# Patient Record
Sex: FEMALE | Race: WHITE | Employment: FULL TIME | URBAN - METROPOLITAN AREA
[De-identification: names, ages, dates, MRNs, and addresses within clinical notes are randomized per-mention and may not be internally consistent; named-entity substitution may affect disease eponyms.]

---

## 2017-04-04 NOTE — PERIOP NOTES
Tranexamic Acid:  Used to minimize blood loss, reduce incidence of        symptomatic blood loss anemia and reduce need for blood transfusions                        Contraindicated: Patient is not a candidate to receive Tranexamic Acid         due to coronary artery disease with stents.            Willie Garcia RN

## 2017-04-11 ENCOUNTER — HOSPITAL ENCOUNTER (OUTPATIENT)
Dept: PREADMISSION TESTING | Age: 52
Discharge: HOME | End: 2017-04-11
Payer: COMMERCIAL

## 2017-04-11 ENCOUNTER — HOSPITAL ENCOUNTER (OUTPATIENT)
Dept: GENERAL RADIOLOGY | Age: 52
Discharge: HOME | End: 2017-04-11
Attending: ORTHOPAEDIC SURGERY
Payer: COMMERCIAL

## 2017-04-11 ENCOUNTER — HOSPITAL ENCOUNTER (OUTPATIENT)
Dept: PHYSICAL THERAPY | Age: 52
Discharge: HOME | End: 2017-04-11
Payer: COMMERCIAL

## 2017-04-11 VITALS
WEIGHT: 257 LBS | RESPIRATION RATE: 18 BRPM | SYSTOLIC BLOOD PRESSURE: 143 MMHG | DIASTOLIC BLOOD PRESSURE: 59 MMHG | HEIGHT: 68 IN | OXYGEN SATURATION: 96 % | HEART RATE: 44 BPM | BODY MASS INDEX: 38.95 KG/M2 | TEMPERATURE: 97.5 F

## 2017-04-11 LAB
ABO + RH BLD: NORMAL
ALBUMIN SERPL BCP-MCNC: 3.8 G/DL (ref 3.5–5)
ALBUMIN/GLOB SERPL: 0.9 {RATIO} (ref 1.1–2.2)
ALP SERPL-CCNC: 69 U/L (ref 45–117)
ALT SERPL-CCNC: 24 U/L (ref 12–78)
ANION GAP BLD CALC-SCNC: 6 MMOL/L (ref 5–15)
APPEARANCE UR: CLEAR
AST SERPL W P-5'-P-CCNC: 14 U/L (ref 15–37)
ATRIAL RATE: 44 BPM
BACTERIA URNS QL MICRO: NEGATIVE /HPF
BILIRUB SERPL-MCNC: 0.5 MG/DL (ref 0.2–1)
BILIRUB UR QL: NEGATIVE
BLOOD GROUP ANTIBODIES SERPL: NORMAL
BUN SERPL-MCNC: 23 MG/DL (ref 6–20)
BUN/CREAT SERPL: 28 (ref 12–20)
CALCIUM SERPL-MCNC: 9.3 MG/DL (ref 8.5–10.1)
CALCULATED P AXIS, ECG09: 39 DEGREES
CALCULATED R AXIS, ECG10: -50 DEGREES
CALCULATED T AXIS, ECG11: -22 DEGREES
CHLORIDE SERPL-SCNC: 102 MMOL/L (ref 97–108)
CO2 SERPL-SCNC: 31 MMOL/L (ref 21–32)
COLOR UR: NORMAL
CREAT SERPL-MCNC: 0.83 MG/DL (ref 0.55–1.02)
DIAGNOSIS, 93000: NORMAL
EPITH CASTS URNS QL MICRO: NORMAL /LPF
ERYTHROCYTE [DISTWIDTH] IN BLOOD BY AUTOMATED COUNT: 14 % (ref 11.5–14.5)
EST. AVERAGE GLUCOSE BLD GHB EST-MCNC: 114 MG/DL
GLOBULIN SER CALC-MCNC: 4.2 G/DL (ref 2–4)
GLUCOSE SERPL-MCNC: 105 MG/DL (ref 65–100)
GLUCOSE UR STRIP.AUTO-MCNC: NEGATIVE MG/DL
HBA1C MFR BLD: 5.6 % (ref 4.2–6.3)
HCT VFR BLD AUTO: 37.4 % (ref 35–47)
HGB BLD-MCNC: 12.1 G/DL (ref 11.5–16)
HGB UR QL STRIP: NEGATIVE
HYALINE CASTS URNS QL MICRO: NORMAL /LPF (ref 0–5)
INR PPP: 1 (ref 0.9–1.1)
KETONES UR QL STRIP.AUTO: NEGATIVE MG/DL
LEUKOCYTE ESTERASE UR QL STRIP.AUTO: NEGATIVE
MCH RBC QN AUTO: 27.7 PG (ref 26–34)
MCHC RBC AUTO-ENTMCNC: 32.4 G/DL (ref 30–36.5)
MCV RBC AUTO: 85.6 FL (ref 80–99)
NITRITE UR QL STRIP.AUTO: NEGATIVE
P-R INTERVAL, ECG05: 164 MS
PH UR STRIP: 5 [PH] (ref 5–8)
PLATELET # BLD AUTO: 247 K/UL (ref 150–400)
POTASSIUM SERPL-SCNC: 3.9 MMOL/L (ref 3.5–5.1)
PROT SERPL-MCNC: 8 G/DL (ref 6.4–8.2)
PROT UR STRIP-MCNC: NEGATIVE MG/DL
PROTHROMBIN TIME: 10.1 SEC (ref 9–11.1)
Q-T INTERVAL, ECG07: 508 MS
QRS DURATION, ECG06: 134 MS
QTC CALCULATION (BEZET), ECG08: 434 MS
RBC # BLD AUTO: 4.37 M/UL (ref 3.8–5.2)
RBC #/AREA URNS HPF: NORMAL /HPF (ref 0–5)
SODIUM SERPL-SCNC: 139 MMOL/L (ref 136–145)
SP GR UR REFRACTOMETRY: 1.01 (ref 1–1.03)
SPECIMEN EXP DATE BLD: NORMAL
UA: UC IF INDICATED,UAUC: NORMAL
UROBILINOGEN UR QL STRIP.AUTO: 0.2 EU/DL (ref 0.2–1)
VENTRICULAR RATE, ECG03: 44 BPM
WBC # BLD AUTO: 8.2 K/UL (ref 3.6–11)
WBC URNS QL MICRO: NORMAL /HPF (ref 0–4)

## 2017-04-11 PROCEDURE — 85610 PROTHROMBIN TIME: CPT | Performed by: ORTHOPAEDIC SURGERY

## 2017-04-11 PROCEDURE — G8980 MOBILITY D/C STATUS: HCPCS

## 2017-04-11 PROCEDURE — 83036 HEMOGLOBIN GLYCOSYLATED A1C: CPT | Performed by: ORTHOPAEDIC SURGERY

## 2017-04-11 PROCEDURE — 97110 THERAPEUTIC EXERCISES: CPT

## 2017-04-11 PROCEDURE — 86900 BLOOD TYPING SEROLOGIC ABO: CPT | Performed by: ORTHOPAEDIC SURGERY

## 2017-04-11 PROCEDURE — G8979 MOBILITY GOAL STATUS: HCPCS

## 2017-04-11 PROCEDURE — 80053 COMPREHEN METABOLIC PANEL: CPT | Performed by: ORTHOPAEDIC SURGERY

## 2017-04-11 PROCEDURE — G8978 MOBILITY CURRENT STATUS: HCPCS

## 2017-04-11 PROCEDURE — 36415 COLL VENOUS BLD VENIPUNCTURE: CPT | Performed by: ORTHOPAEDIC SURGERY

## 2017-04-11 PROCEDURE — 93005 ELECTROCARDIOGRAM TRACING: CPT

## 2017-04-11 PROCEDURE — 85027 COMPLETE CBC AUTOMATED: CPT | Performed by: ORTHOPAEDIC SURGERY

## 2017-04-11 PROCEDURE — 81001 URINALYSIS AUTO W/SCOPE: CPT | Performed by: ORTHOPAEDIC SURGERY

## 2017-04-11 PROCEDURE — 97161 PT EVAL LOW COMPLEX 20 MIN: CPT

## 2017-04-11 RX ORDER — FLAXSEED OIL 1000 MG
1 CAPSULE ORAL DAILY
COMMUNITY

## 2017-04-11 RX ORDER — SODIUM CHLORIDE, SODIUM LACTATE, POTASSIUM CHLORIDE, CALCIUM CHLORIDE 600; 310; 30; 20 MG/100ML; MG/100ML; MG/100ML; MG/100ML
25 INJECTION, SOLUTION INTRAVENOUS CONTINUOUS
Status: CANCELLED | OUTPATIENT
Start: 2017-04-19

## 2017-04-11 RX ORDER — ASPIRIN 81 MG/1
81 TABLET ORAL DAILY
COMMUNITY
End: 2017-04-20

## 2017-04-11 RX ORDER — ATORVASTATIN CALCIUM 40 MG/1
40 TABLET, FILM COATED ORAL DAILY
COMMUNITY

## 2017-04-11 RX ORDER — DOCUSATE SODIUM 100 MG/1
100 CAPSULE, LIQUID FILLED ORAL
COMMUNITY

## 2017-04-11 RX ORDER — HYDROCODONE BITARTRATE AND ACETAMINOPHEN 5; 325 MG/1; MG/1
1 TABLET ORAL
COMMUNITY
End: 2017-04-20

## 2017-04-11 RX ORDER — METFORMIN HYDROCHLORIDE 500 MG/1
500 TABLET ORAL
COMMUNITY

## 2017-04-11 NOTE — H&P
[facility]   [place], [address]   STAT PREOP HISTORY AND PHYSICAL       Name:  Gita Salcido   MR#:  [id number]   :  1965   Account #:  [de-identified]        Date of Adm:  2017       CHIEF COMPLAINT: Left knee pain. HISTORY: The patient is a very nice 38-year-old white female with   end-stage osteoarthritis of the left knee with severe limitation of range   of motion. She has had allergic reactions to cortisone and lidocaine,   and she is at the point where she has been taking anti-inflammatory   medications and her knee is not responding to this. She is now being   admitted for left total knee arthroplasty. ALLERGIES: CORTISONE CAUSES HER TO BE ITCHY AND HAVE   SMALL BUMPS. MEDICATIONS:   1. Atorvastatin 40 mg per day. 2. Celexa 20 mg per day. 3. Lasix 20 mg.   4. Hydrocodone. 5. Lisinopril/hydrochlorothiazide 20/12.5 one per day. 6. Meloxicam. 15 mg per day. 7. Metformin 500 mg in the morning. 8. Metoprolol 25 mg per day. ILLNESSES: Positive for hypertension, coronary artery disease,   diabetes, history of DVT, history of MRSA infection, left ankle. 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 SURGERIES: He has had cardiac catheterization with stent, vein   dilation, bilateral lower extremity, , tubal ligation. FAMILY HISTORY: Mother is alive. Father is alive with coronary artery   disease. 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 She is   left-handed. She does not smoke or drink. She is  and has 2   kids. REVIEW OF SYSTEMS   HEENT: She wears glasses. PULMONARY: No asthma, COPD, emphysema. CARDIAC: No chest pain, shortness of breath. GI: No peptic ulcer disease or GERD. : Negative. HEPATOBILIARY: No jaundice or hepatitis.     PHYSICAL EXAMINATION   GENERAL: Reveals an alert, pleasant white female. VITAL SIGNS: Blood pressure 110/60, pulse 60, temperature 98.2. NOSE, MOUTH, AND OROPHARYNX: Clear. CHEST: Clear to auscultation. CARDIAC: Sinus rhythm without murmurs, gallops, thrills. ABDOMEN: Soft. Bowel sounds are present. EXTREMITIES:  strength is equal in the upper extremities. She   has good forward flexion of the shoulders. Skin overlying the left knee   is intact. She has a good pulse in the left foot. The left knee range of   motion is from 15-85 degrees. IMAGING: X-rays demonstrate severe left knee osteoarthritis. IMPRESSION:   1. Severe left knee osteoarthritis. 2. Hypertension. 3. Coronary artery disease. 4. Diabetes mellitus. 5. History of MRSA infection. 6. History of DVT. PLAN: He is going to be admitted to the hospital and will have a left   total knee arthroplasty performed. I have discussed the proposed   surgery, the risks, the complications, alternatives ,and expected   postoperative course with her. She understands and agrees to   proceed.         MD Darnell Ramírez / [facility]   D:  04/11/2017   15:04   T:  04/11/2017   15:21   Job #:  [id number]

## 2017-04-11 NOTE — PERIOP NOTES
[facility]  Preoperative Instructions        Surgery Date 04/19/2017          Time of Arrival 0545 am Contact # [phone]    1. On the day of your surgery, please report to the Surgical Services Registration Desk and sign in at your designated time. The Surgery Center is located to the right of the Emergency Room. 2. You must have someone with you to drive you home. You should not drive a car for 24 hours following surgery. Please make arrangements for a friend or family member to stay with you for the first 24 hours after your surgery. 3. Do not have anything to eat or drink (including water, gum, mints, coffee, juice) after midnight 04/18/2017. This may not apply to medications prescribed by your physician. Please note special instructions, if applicable. If you are currently taking Plavix, Coumadin, or other blood-thinning agents, contact your surgeon for instructions. 4. We recommend you do not drink any alcoholic beverages for 24 hours before and after your surgery. 5. Have a list of all current medications, including vitamins, herbal supplements and any other over the counter medications. Stop all Aspirin and non-steroidal anti-inflammatory drugs (I.e. Advil, Aleve), as directed by your surgeon's office. Stop all vitamins and herbal supplements seven days prior to your surgery. 6. Wear comfortable clothes. Wear glasses instead of contacts. Do not bring any money or jewelry. Please bring picture ID, insurance card, and any prearranged co-payment or hospital payment. Do not wear make-up, particularly mascara the morning of your surgery. Do not wear nail polish, particularly if you are having foot /hand surgery. Wear your hair loose or down, no ponytails, buns, alexx pins or clips. All body piercings must be removed.   Please shower with antibacterial soap for three consecutive days before and on the morning of surgery, but do not apply any lotions, powders or deodorants after the shower on the day of surgery. Please use a fresh towels after each shower. Please sleep in clean clothes and change bed linens the night before surgery. Please do not shave for 48 hours prior to surgery. Shaving of the face is acceptable. 7. You should understand that if you do not follow these instructions your surgery may be cancelled. If your physical condition changes (I.e. fever, cold or flu) please contact your surgeon as soon as possible. 8. It is important that you be on time. If a situation occurs where you may be late, please call [phone] (OR Holding Area). 9. If you have any questions and or problems, please call [phone] (Pre-admission Testing). 10. Your surgery time may be subject to change. You will receive a phone call the evening prior if your time changes. 11.  If having outpatient surgery, you must have someone to drive you here, stay with you during the duration of your stay, and to drive you home at time of discharge. Special Instructions:    MEDICATIONS TO TAKE THE MORNING OF SURGERY WITH A SIP OF WATER:celexa, hydrocodone or tramadol if needed, metoprolol      I understand a pre-operative phone call will be made to verify my surgery time. In the event that I am not available, I give permission for a message to be left on my answering service and/or with another person?   yes         ___________________      __________   _________    (Signature of Patient)             (Witness)                (Date and Time)

## 2017-04-11 NOTE — PROGRESS NOTES
[facility]  Physical Therapy Pre-surgery evaluation  [address] [facility], [address] [facility]    physical Therapy pre TKR surgery EVALUATION  Patient: Jaspal Olvera (47 y.o. female)  Date: 4/11/2017  Primary Diagnosis: left knee        Precautions:        ASSESSMENT :  Based on the objective data described below, the patient presents with impaired gait, balance, pain, and overall high level functional mobility due to end stage degenerative joint disease in the left knee. Pt reports independence w/ house ambulation however use of SPC during community ambulation, independence w/ ADLs, and denies history of falls. Discussed anticipated disposition to home with possible discharge within a 1 to 2 day time frame post-surgery. Patient in agreement. Pt reports that she lives with  and he will be providing 24hr assist at discharge. GOALS: (Goals have been discussed and agreed upon with patient.)  DISCHARGE GOALS: Time Frame: 1 DAY  1. Patient will demonstrate increased strength, range of motion, and pain control via a home exercise program in order to minimize functional deficits in preparation for their upcoming surgery. This will be achieved by using education, demonstration and through the use of an informational handout including a home exercise program.  REHABILITATION POTENTIAL FOR STATED GOALS: Good     RECOMMENDATIONS AND PLANNED INTERVENTIONS: (Benefits and precautions of physical therapy have been discussed with the patient.)  1. Home Exercise Program  TREATMENT PLAN EFFECTIVE DATES: 4/11/2017 TO 4/11/2017  FREQUENCY/DURATION: Patient to continue to perform home exercise program at least twice daily until her surgery. SUBJECTIVE:   Patient stated My knee kills me after a day at work. I work as a CNA.     OBJECTIVE DATA SUMMARY:   HISTORY:    Past Medical History:   Diagnosis Date    Arthritis     Deep vein thrombosis (DVT) ([address]     left leg    Hypertension     Ill-defined condition     high cholesterol    Ill-defined condition     non healing vein ulcer    MI (my[facility] infarction) [address] [facility]     2 stents     Past Surgical History:   Procedure Laterality Date    CARDIAC SURG PROCEDURE UNLIST      x2 stents    HX  SECTION      VASCULAR SURGERY PROCEDURE UNLIST      angioplasty     Prior Level of Function/Home Situation: Independent w/ household ambulation however use of SPC during community ambulation, ADLs, and denies history of falls. Still driving and working full time. Lives with  who will be assisting at discharge. Denies SOB with daily activity   Personal factors and/or comorbidities impacting plan of care:     Patient []   does  [x]   does not state signs/symptoms of shortness of breath/dyspnea on exertion/respiratory distress. Home Situation  Home Environment: Private residence  # Steps to Enter: 3  Rails to Enter: Yes  Hand Rails : Bilateral  Wheelchair Ramp: No  One/Two Story Residence: One story  Living Alone: No ( )  Support Systems: Family member(s)  Patient Expects to be Discharged to[de-identified] Private residence  Current DME Used/Available at Home: Cane, straight  Tub or Shower Type: Tub/Shower combination    EXAMINATION/PRESENTATION/DECISION MAKING:     ADLs (Current Functional Status):    Bathing/Showering:   [x] Independent  [] Requires Assistance from Someone  [] Sponge Bath Only   Ambulation:  [x] Independent  [] Walk Indoors Only  [] Walk Outdoors  [] Use Assistive Device  [] Use Wheelchair Only     Dressing:  [x] Independent    Requires Assistance from Someone for:  [] Sock/Shoes  [] Pants  [] Everything   Household Activities:  [x] Routine house and yard work  [] Light Housework Only  [] None       Critical Behavior:                Strength:    Strength: Generally decreased, functional                    Tone & Sensation:   Tone: Normal              Sensation: Intact               Range Of Motion:  AROM: Generally decreased, functional                       Coordination:  Coordination: Within functional limits    Functional Mobility:  Transfers:  Sit to Stand: Independent  Stand to Sit: Independent                       Balance:   Sitting: Intact  Standing: Intact  Ambulation/Gait Training:  Distance (ft): 40 Feet (ft)     Ambulation - Level of Assistance: Independent        Gait Abnormalities: Antalgic        Base of Support: Widened     Speed/Betina: Pace decreased (<100 feet/min)                     Antalgic gait pattern however independent overall with all mobility. No balance deficits. Therapeutic Exercises:   The patient was educated in, has demonstrated, and has received written instructions to complete for their home exercise program per total knee replacement protocol. Functional Measure:  Lower Extremity Functional Scale (LEFS):      Score 10/80     Percentage of impairment CH  0% CI  1-19% CJ  20-39% CK  40-59% CL  60-79% CM  80-99% CN  100%   LEFS score:  0-80 80 64-79 47-63 31-46 16-30 1-15 0     Cutt-offs: None established  TKA and HERMINIA:   (Micaela et al, 2000)  MDC = 9 points   MCID = 9 points           G codes: In compliance with CMSs Claims Based Outcome Reporting, the following G-code set was chosen for this patient based on their primary functional limitation being treated: The outcome measure chosen to determine the severity of the functional limitation was the LEFS with a score of 10/80 which was correlated with the impairment scale.     ? Mobility - Walking and Moving Around:     - CURRENT STATUS: CM - 80%-99% impaired, limited or restricted    - GOAL STATUS: CM - 80%-99% impaired, limited or restricted    - D/C STATUS:  CM - 80%-99% impaired, limited or restricted     Pain:  Pain Scale 1: Numeric (0 - 10)  Pain Intensity 1: 10  Pain Location 1: Knee  Pain Orientation 1: Left  Pain Description 1: Aching       Activity Tolerance:   VSS on RA, no SOB  Patient []   does  [x]   does not demonstrate signs/symptoms of shortness of breath/dyspnea on exertion/respiratory distress. COMMUNICATION/EDUCATION:   The patient was educated on:  [x]         Importance of post-operative mobility to achieve their desired outcomes and restore biological function  [x]         The key post-operative time frame to address ROM to prevent additional complications    The patients plan of care was discussed with:   [x]         The patient verbalized understanding of her plan in preparation for their upcoming surgery  []         The patient's  was present for this session  []         The patient reports that he/she does not have a  identified at this time  []         The  verbalized understanding of the education regarding the patient's upcoming surgery  [x]         Patient/family agree to work toward stated goals and plan of care. []         Patient understands intent and goals of therapy, but is neutral about his/her participation. []         Patient is unable to participate in goal setting and plan of care.     Thank you for this referral.  Mily Egan, PT, DPT   Time Calculation: 23 mins

## 2017-04-11 NOTE — PERIOP NOTES
Ms. Jerome Oneil has a history of MRSA infection of the ankle. Dr. Noni Jeffries has ordered Mupirocin ointment for the nares bid from now till surgery, as well as chlorhexidine body wash daily till surgery. We have also ordered Vancomycin in addition to her IV Ancef for pre op antiobiotic.   Jl Ferreira RN

## 2017-04-12 LAB
BACTERIA SPEC CULT: NORMAL
BACTERIA SPEC CULT: NORMAL
SERVICE CMNT-IMP: NORMAL

## 2017-04-12 NOTE — PERIOP NOTES
EKG from 04/11/2017 reviewed by Dr Criss Hart along with comparison from 07/23/2013 and cleared for surgery.

## 2017-04-14 NOTE — PERIOP NOTES
Spoke to Jose Osman, Infection Control Nurse and asked about if pt can be de-flagged since MRSA culture is negative. She will de- flag pt.

## 2017-04-17 NOTE — PERIOP NOTES
Called Dr Hall's office and left voice message for nurse regarding clearance note. Requested clearance note to be faxed.

## 2017-04-18 NOTE — PERIOP NOTES
Pre-op call made and patient given TOA. Patient instructed may have water up to 3:45 am and then NPO.  Patient verbalized understanding

## 2017-04-19 ENCOUNTER — HOSPITAL ENCOUNTER (INPATIENT)
Age: 52
LOS: 1 days | Discharge: HOME HEALTH | DRG: 470 | End: 2017-04-20
Attending: ORTHOPAEDIC SURGERY | Admitting: ORTHOPAEDIC SURGERY
Payer: COMMERCIAL

## 2017-04-19 ENCOUNTER — ANESTHESIA EVENT (OUTPATIENT)
Dept: SURGERY | Age: 52
DRG: 470 | End: 2017-04-19
Payer: COMMERCIAL

## 2017-04-19 ENCOUNTER — ANESTHESIA (OUTPATIENT)
Dept: SURGERY | Age: 52
DRG: 470 | End: 2017-04-19
Payer: COMMERCIAL

## 2017-04-19 PROBLEM — M17.12 PRIMARY LOCALIZED OSTEOARTHRITIS OF LEFT KNEE: Status: ACTIVE | Noted: 2017-04-19

## 2017-04-19 PROBLEM — M17.12 ARTHRITIS OF KNEE, LEFT: Status: ACTIVE | Noted: 2017-04-19

## 2017-04-19 LAB
GLUCOSE BLD STRIP.AUTO-MCNC: 113 MG/DL (ref 65–100)
GLUCOSE BLD STRIP.AUTO-MCNC: 113 MG/DL (ref 65–100)
GLUCOSE BLD STRIP.AUTO-MCNC: 114 MG/DL (ref 65–100)
GLUCOSE BLD STRIP.AUTO-MCNC: 117 MG/DL (ref 65–100)
GLUCOSE BLD STRIP.AUTO-MCNC: 121 MG/DL (ref 65–100)
HCG UR QL: NEGATIVE
SERVICE CMNT-IMP: ABNORMAL

## 2017-04-19 PROCEDURE — 77030018836 HC SOL IRR NACL ICUM -A: Performed by: ORTHOPAEDIC SURGERY

## 2017-04-19 PROCEDURE — 74011000250 HC RX REV CODE- 250

## 2017-04-19 PROCEDURE — 76210000006 HC OR PH I REC 0.5 TO 1 HR: Performed by: ORTHOPAEDIC SURGERY

## 2017-04-19 PROCEDURE — 74011250636 HC RX REV CODE- 250/636: Performed by: ANESTHESIOLOGY

## 2017-04-19 PROCEDURE — 77030020061 HC IV BLD WRMR ADMIN SET 3M -B: Performed by: ANESTHESIOLOGY

## 2017-04-19 PROCEDURE — 0SRD0J9 REPLACEMENT OF LEFT KNEE JOINT WITH SYNTHETIC SUBSTITUTE, CEMENTED, OPEN APPROACH: ICD-10-PCS | Performed by: ORTHOPAEDIC SURGERY

## 2017-04-19 PROCEDURE — C1713 ANCHOR/SCREW BN/BN,TIS/BN: HCPCS | Performed by: ORTHOPAEDIC SURGERY

## 2017-04-19 PROCEDURE — 74011250637 HC RX REV CODE- 250/637: Performed by: ORTHOPAEDIC SURGERY

## 2017-04-19 PROCEDURE — 77030018846 HC SOL IRR STRL H20 ICUM -A: Performed by: ORTHOPAEDIC SURGERY

## 2017-04-19 PROCEDURE — 76060000036 HC ANESTHESIA 2.5 TO 3 HR: Performed by: ORTHOPAEDIC SURGERY

## 2017-04-19 PROCEDURE — 77030035236 HC SUT PDS STRATFX BARB J&J -B: Performed by: ORTHOPAEDIC SURGERY

## 2017-04-19 PROCEDURE — 77030018779 HC MIX CEM PRSM J&J -B: Performed by: ORTHOPAEDIC SURGERY

## 2017-04-19 PROCEDURE — 77030016547 HC BLD SAW SAG1 STRY -B: Performed by: ORTHOPAEDIC SURGERY

## 2017-04-19 PROCEDURE — 74011250636 HC RX REV CODE- 250/636: Performed by: ORTHOPAEDIC SURGERY

## 2017-04-19 PROCEDURE — 65270000029 HC RM PRIVATE

## 2017-04-19 PROCEDURE — 74011000258 HC RX REV CODE- 258: Performed by: ORTHOPAEDIC SURGERY

## 2017-04-19 PROCEDURE — 97161 PT EVAL LOW COMPLEX 20 MIN: CPT

## 2017-04-19 PROCEDURE — 77030028907 HC WRP KNEE WO BGS SOLM -B

## 2017-04-19 PROCEDURE — 77030032490 HC SLV COMPR SCD KNE COVD -B: Performed by: ORTHOPAEDIC SURGERY

## 2017-04-19 PROCEDURE — 77030020788: Performed by: ORTHOPAEDIC SURGERY

## 2017-04-19 PROCEDURE — 77030020782 HC GWN BAIR PAWS FLX 3M -B

## 2017-04-19 PROCEDURE — 77030031139 HC SUT VCRL2 J&J -A: Performed by: ORTHOPAEDIC SURGERY

## 2017-04-19 PROCEDURE — 77030000032 HC CUF TRNQT ZIMM -B: Performed by: ORTHOPAEDIC SURGERY

## 2017-04-19 PROCEDURE — G8978 MOBILITY CURRENT STATUS: HCPCS

## 2017-04-19 PROCEDURE — 74011250636 HC RX REV CODE- 250/636

## 2017-04-19 PROCEDURE — 77030019908 HC STETH ESOPH SIMS -A: Performed by: ANESTHESIOLOGY

## 2017-04-19 PROCEDURE — 97116 GAIT TRAINING THERAPY: CPT

## 2017-04-19 PROCEDURE — 77030011640 HC PAD GRND REM COVD -A: Performed by: ORTHOPAEDIC SURGERY

## 2017-04-19 PROCEDURE — P9045 ALBUMIN (HUMAN), 5%, 250 ML: HCPCS

## 2017-04-19 PROCEDURE — 82962 GLUCOSE BLOOD TEST: CPT

## 2017-04-19 PROCEDURE — 77030014125 HC TY EPDRL BBMI -B: Performed by: ANESTHESIOLOGY

## 2017-04-19 PROCEDURE — 77030008467 HC STPLR SKN COVD -B: Performed by: ORTHOPAEDIC SURGERY

## 2017-04-19 PROCEDURE — 77030033138 HC SUT PGA STRATFX J&J -B: Performed by: ORTHOPAEDIC SURGERY

## 2017-04-19 PROCEDURE — 74011000250 HC RX REV CODE- 250: Performed by: ORTHOPAEDIC SURGERY

## 2017-04-19 PROCEDURE — 77030012406 HC DRN WND PENRS BARD -A: Performed by: ORTHOPAEDIC SURGERY

## 2017-04-19 PROCEDURE — G8979 MOBILITY GOAL STATUS: HCPCS

## 2017-04-19 PROCEDURE — 77030034849: Performed by: ORTHOPAEDIC SURGERY

## 2017-04-19 PROCEDURE — 76010000172 HC OR TIME 2.5 TO 3 HR INTENSV-TIER 1: Performed by: ORTHOPAEDIC SURGERY

## 2017-04-19 PROCEDURE — 74011000272 HC RX REV CODE- 272: Performed by: ORTHOPAEDIC SURGERY

## 2017-04-19 PROCEDURE — 81025 URINE PREGNANCY TEST: CPT

## 2017-04-19 PROCEDURE — C1776 JOINT DEVICE (IMPLANTABLE): HCPCS | Performed by: ORTHOPAEDIC SURGERY

## 2017-04-19 DEVICE — COMPONENT FEM SZ 7 L KNEE POST STBL CEM ATTUNE: Type: IMPLANTABLE DEVICE | Site: KNEE | Status: FUNCTIONAL

## 2017-04-19 DEVICE — COMPONENT PAT DIA38MM POLYETH DOME CEM MEDIALIZED ATTUNE: Type: IMPLANTABLE DEVICE | Site: KNEE | Status: FUNCTIONAL

## 2017-04-19 DEVICE — INSERT TIB RP FEM KNEE CEM: Type: IMPLANTABLE DEVICE | Site: KNEE | Status: FUNCTIONAL

## 2017-04-19 DEVICE — INSERT TIB SZ 7 THK7MM KNEE POST STBL FIX BEAR ATTUNE: Type: IMPLANTABLE DEVICE | Site: KNEE | Status: FUNCTIONAL

## 2017-04-19 DEVICE — CEMENT BNE 40GM FULL DOSE PMMA W/O GENT M VISC N RADPQ LNG: Type: IMPLANTABLE DEVICE | Site: KNEE | Status: FUNCTIONAL

## 2017-04-19 DEVICE — BASEPLATE TIB SZ 5 KNEE CEM FIX BEAR ATTUNE: Type: IMPLANTABLE DEVICE | Site: KNEE | Status: FUNCTIONAL

## 2017-04-19 RX ORDER — VANCOMYCIN/0.9 % SOD CHLORIDE 1.5G/250ML
1500 PLASTIC BAG, INJECTION (ML) INTRAVENOUS ONCE
Status: COMPLETED | OUTPATIENT
Start: 2017-04-19 | End: 2017-04-19

## 2017-04-19 RX ORDER — ONDANSETRON 2 MG/ML
4 INJECTION INTRAMUSCULAR; INTRAVENOUS AS NEEDED
Status: DISCONTINUED | OUTPATIENT
Start: 2017-04-19 | End: 2017-04-19 | Stop reason: HOSPADM

## 2017-04-19 RX ORDER — PROPOFOL 10 MG/ML
INJECTION, EMULSION INTRAVENOUS
Status: DISCONTINUED | OUTPATIENT
Start: 2017-04-19 | End: 2017-04-19 | Stop reason: HOSPADM

## 2017-04-19 RX ORDER — VANCOMYCIN/0.9 % SOD CHLORIDE 1.5G/250ML
1500 PLASTIC BAG, INJECTION (ML) INTRAVENOUS EVERY 12 HOURS
Status: COMPLETED | OUTPATIENT
Start: 2017-04-19 | End: 2017-04-19

## 2017-04-19 RX ORDER — KETAMINE HYDROCHLORIDE 100 MG/ML
INJECTION, SOLUTION INTRAMUSCULAR; INTRAVENOUS AS NEEDED
Status: DISCONTINUED | OUTPATIENT
Start: 2017-04-19 | End: 2017-04-19 | Stop reason: HOSPADM

## 2017-04-19 RX ORDER — HYDROMORPHONE HYDROCHLORIDE 1 MG/ML
0.5 INJECTION, SOLUTION INTRAMUSCULAR; INTRAVENOUS; SUBCUTANEOUS
Status: DISCONTINUED | OUTPATIENT
Start: 2017-04-19 | End: 2017-04-19 | Stop reason: HOSPADM

## 2017-04-19 RX ORDER — SODIUM CHLORIDE 9 MG/ML
125 INJECTION, SOLUTION INTRAVENOUS CONTINUOUS
Status: DISPENSED | OUTPATIENT
Start: 2017-04-19 | End: 2017-04-20

## 2017-04-19 RX ORDER — DIPHENHYDRAMINE HYDROCHLORIDE 50 MG/ML
12.5 INJECTION, SOLUTION INTRAMUSCULAR; INTRAVENOUS AS NEEDED
Status: DISCONTINUED | OUTPATIENT
Start: 2017-04-19 | End: 2017-04-19 | Stop reason: HOSPADM

## 2017-04-19 RX ORDER — NALOXONE HYDROCHLORIDE 0.4 MG/ML
0.4 INJECTION, SOLUTION INTRAMUSCULAR; INTRAVENOUS; SUBCUTANEOUS AS NEEDED
Status: DISCONTINUED | OUTPATIENT
Start: 2017-04-19 | End: 2017-04-20 | Stop reason: HOSPADM

## 2017-04-19 RX ORDER — AMOXICILLIN 250 MG
1 CAPSULE ORAL 2 TIMES DAILY
Status: DISCONTINUED | OUTPATIENT
Start: 2017-04-19 | End: 2017-04-20 | Stop reason: HOSPADM

## 2017-04-19 RX ORDER — KETOROLAC TROMETHAMINE 30 MG/ML
15 INJECTION, SOLUTION INTRAMUSCULAR; INTRAVENOUS EVERY 6 HOURS
Status: DISPENSED | OUTPATIENT
Start: 2017-04-19 | End: 2017-04-20

## 2017-04-19 RX ORDER — MAGNESIUM SULFATE 100 %
4 CRYSTALS MISCELLANEOUS AS NEEDED
Status: DISCONTINUED | OUTPATIENT
Start: 2017-04-19 | End: 2017-04-20 | Stop reason: HOSPADM

## 2017-04-19 RX ORDER — HYDROCODONE BITARTRATE AND ACETAMINOPHEN 5; 325 MG/1; MG/1
1 TABLET ORAL AS NEEDED
Status: DISCONTINUED | OUTPATIENT
Start: 2017-04-19 | End: 2017-04-19 | Stop reason: HOSPADM

## 2017-04-19 RX ORDER — CITALOPRAM 20 MG/1
20 TABLET, FILM COATED ORAL DAILY
Status: DISCONTINUED | OUTPATIENT
Start: 2017-04-19 | End: 2017-04-20 | Stop reason: HOSPADM

## 2017-04-19 RX ORDER — LIDOCAINE HYDROCHLORIDE 10 MG/ML
0.1 INJECTION, SOLUTION EPIDURAL; INFILTRATION; INTRACAUDAL; PERINEURAL AS NEEDED
Status: DISCONTINUED | OUTPATIENT
Start: 2017-04-19 | End: 2017-04-19 | Stop reason: HOSPADM

## 2017-04-19 RX ORDER — SODIUM CHLORIDE 0.9 % (FLUSH) 0.9 %
5-10 SYRINGE (ML) INJECTION AS NEEDED
Status: DISCONTINUED | OUTPATIENT
Start: 2017-04-19 | End: 2017-04-20 | Stop reason: HOSPADM

## 2017-04-19 RX ORDER — POLYETHYLENE GLYCOL 3350 17 G/17G
17 POWDER, FOR SOLUTION ORAL DAILY
Status: DISCONTINUED | OUTPATIENT
Start: 2017-04-19 | End: 2017-04-20 | Stop reason: HOSPADM

## 2017-04-19 RX ORDER — BUPIVACAINE HYDROCHLORIDE 7.5 MG/ML
INJECTION, SOLUTION EPIDURAL; RETROBULBAR AS NEEDED
Status: DISCONTINUED | OUTPATIENT
Start: 2017-04-19 | End: 2017-04-19 | Stop reason: HOSPADM

## 2017-04-19 RX ORDER — METOPROLOL TARTRATE 25 MG/1
25 TABLET, FILM COATED ORAL DAILY
Status: DISCONTINUED | OUTPATIENT
Start: 2017-04-19 | End: 2017-04-20 | Stop reason: HOSPADM

## 2017-04-19 RX ORDER — GLYCOPYRROLATE 0.2 MG/ML
INJECTION INTRAMUSCULAR; INTRAVENOUS AS NEEDED
Status: DISCONTINUED | OUTPATIENT
Start: 2017-04-19 | End: 2017-04-19 | Stop reason: HOSPADM

## 2017-04-19 RX ORDER — ONDANSETRON 2 MG/ML
4 INJECTION INTRAMUSCULAR; INTRAVENOUS
Status: ACTIVE | OUTPATIENT
Start: 2017-04-19 | End: 2017-04-20

## 2017-04-19 RX ORDER — SODIUM CHLORIDE, SODIUM LACTATE, POTASSIUM CHLORIDE, CALCIUM CHLORIDE 600; 310; 30; 20 MG/100ML; MG/100ML; MG/100ML; MG/100ML
25 INJECTION, SOLUTION INTRAVENOUS CONTINUOUS
Status: DISCONTINUED | OUTPATIENT
Start: 2017-04-19 | End: 2017-04-19 | Stop reason: HOSPADM

## 2017-04-19 RX ORDER — PROPOFOL 10 MG/ML
INJECTION, EMULSION INTRAVENOUS AS NEEDED
Status: DISCONTINUED | OUTPATIENT
Start: 2017-04-19 | End: 2017-04-19 | Stop reason: HOSPADM

## 2017-04-19 RX ORDER — OXYCODONE HYDROCHLORIDE 5 MG/1
10 TABLET ORAL
Status: DISCONTINUED | OUTPATIENT
Start: 2017-04-19 | End: 2017-04-20 | Stop reason: HOSPADM

## 2017-04-19 RX ORDER — VANCOMYCIN HYDROCHLORIDE 1 G/20ML
INJECTION, POWDER, LYOPHILIZED, FOR SOLUTION INTRAVENOUS AS NEEDED
Status: DISCONTINUED | OUTPATIENT
Start: 2017-04-19 | End: 2017-04-19 | Stop reason: HOSPADM

## 2017-04-19 RX ORDER — DEXTROSE 50 % IN WATER (D50W) INTRAVENOUS SYRINGE
12.5-25 AS NEEDED
Status: DISCONTINUED | OUTPATIENT
Start: 2017-04-19 | End: 2017-04-20 | Stop reason: HOSPADM

## 2017-04-19 RX ORDER — ACETAMINOPHEN 325 MG/1
650 TABLET ORAL EVERY 6 HOURS
Status: DISCONTINUED | OUTPATIENT
Start: 2017-04-19 | End: 2017-04-20 | Stop reason: HOSPADM

## 2017-04-19 RX ORDER — FENTANYL CITRATE 50 UG/ML
50 INJECTION, SOLUTION INTRAMUSCULAR; INTRAVENOUS AS NEEDED
Status: DISCONTINUED | OUTPATIENT
Start: 2017-04-19 | End: 2017-04-19 | Stop reason: HOSPADM

## 2017-04-19 RX ORDER — ASPIRIN 325 MG
325 TABLET, DELAYED RELEASE (ENTERIC COATED) ORAL 2 TIMES DAILY
Status: DISCONTINUED | OUTPATIENT
Start: 2017-04-19 | End: 2017-04-20 | Stop reason: HOSPADM

## 2017-04-19 RX ORDER — ACETAMINOPHEN 500 MG
1000 TABLET ORAL ONCE
Status: COMPLETED | OUTPATIENT
Start: 2017-04-19 | End: 2017-04-19

## 2017-04-19 RX ORDER — HYDROMORPHONE HYDROCHLORIDE 1 MG/ML
INJECTION, SOLUTION INTRAMUSCULAR; INTRAVENOUS; SUBCUTANEOUS AS NEEDED
Status: DISCONTINUED | OUTPATIENT
Start: 2017-04-19 | End: 2017-04-19 | Stop reason: HOSPADM

## 2017-04-19 RX ORDER — FENTANYL CITRATE 50 UG/ML
25 INJECTION, SOLUTION INTRAMUSCULAR; INTRAVENOUS
Status: DISCONTINUED | OUTPATIENT
Start: 2017-04-19 | End: 2017-04-19 | Stop reason: HOSPADM

## 2017-04-19 RX ORDER — SODIUM CHLORIDE 0.9 % (FLUSH) 0.9 %
5-10 SYRINGE (ML) INJECTION EVERY 8 HOURS
Status: DISCONTINUED | OUTPATIENT
Start: 2017-04-20 | End: 2017-04-20 | Stop reason: HOSPADM

## 2017-04-19 RX ORDER — FACIAL-BODY WIPES
10 EACH TOPICAL DAILY PRN
Status: DISCONTINUED | OUTPATIENT
Start: 2017-04-21 | End: 2017-04-20 | Stop reason: HOSPADM

## 2017-04-19 RX ORDER — ATORVASTATIN CALCIUM 40 MG/1
40 TABLET, FILM COATED ORAL DAILY
Status: DISCONTINUED | OUTPATIENT
Start: 2017-04-19 | End: 2017-04-20 | Stop reason: HOSPADM

## 2017-04-19 RX ORDER — OXYCODONE HYDROCHLORIDE 5 MG/1
5 TABLET ORAL
Status: DISCONTINUED | OUTPATIENT
Start: 2017-04-19 | End: 2017-04-20 | Stop reason: HOSPADM

## 2017-04-19 RX ORDER — INSULIN LISPRO 100 [IU]/ML
INJECTION, SOLUTION INTRAVENOUS; SUBCUTANEOUS
Status: DISCONTINUED | OUTPATIENT
Start: 2017-04-19 | End: 2017-04-20 | Stop reason: HOSPADM

## 2017-04-19 RX ORDER — DIPHENHYDRAMINE HCL 12.5MG/5ML
12.5 ELIXIR ORAL
Status: ACTIVE | OUTPATIENT
Start: 2017-04-19 | End: 2017-04-20

## 2017-04-19 RX ORDER — SODIUM CHLORIDE, SODIUM LACTATE, POTASSIUM CHLORIDE, CALCIUM CHLORIDE 600; 310; 30; 20 MG/100ML; MG/100ML; MG/100ML; MG/100ML
100 INJECTION, SOLUTION INTRAVENOUS CONTINUOUS
Status: DISCONTINUED | OUTPATIENT
Start: 2017-04-19 | End: 2017-04-19 | Stop reason: HOSPADM

## 2017-04-19 RX ORDER — MIDAZOLAM HYDROCHLORIDE 1 MG/ML
1 INJECTION, SOLUTION INTRAMUSCULAR; INTRAVENOUS AS NEEDED
Status: DISCONTINUED | OUTPATIENT
Start: 2017-04-19 | End: 2017-04-19 | Stop reason: HOSPADM

## 2017-04-19 RX ORDER — FUROSEMIDE 20 MG/1
20 TABLET ORAL DAILY
Status: DISCONTINUED | OUTPATIENT
Start: 2017-04-20 | End: 2017-04-20 | Stop reason: HOSPADM

## 2017-04-19 RX ORDER — CELECOXIB 200 MG/1
200 CAPSULE ORAL ONCE
Status: COMPLETED | OUTPATIENT
Start: 2017-04-19 | End: 2017-04-19

## 2017-04-19 RX ORDER — DOCUSATE SODIUM 100 MG/1
100 CAPSULE, LIQUID FILLED ORAL
Status: DISCONTINUED | OUTPATIENT
Start: 2017-04-19 | End: 2017-04-20 | Stop reason: HOSPADM

## 2017-04-19 RX ORDER — FAMOTIDINE 20 MG/1
20 TABLET, FILM COATED ORAL 2 TIMES DAILY
Status: DISCONTINUED | OUTPATIENT
Start: 2017-04-19 | End: 2017-04-20 | Stop reason: HOSPADM

## 2017-04-19 RX ORDER — ALBUMIN HUMAN 50 G/1000ML
SOLUTION INTRAVENOUS AS NEEDED
Status: DISCONTINUED | OUTPATIENT
Start: 2017-04-19 | End: 2017-04-19 | Stop reason: HOSPADM

## 2017-04-19 RX ORDER — MIDAZOLAM HYDROCHLORIDE 1 MG/ML
0.5 INJECTION, SOLUTION INTRAMUSCULAR; INTRAVENOUS
Status: DISCONTINUED | OUTPATIENT
Start: 2017-04-19 | End: 2017-04-19 | Stop reason: HOSPADM

## 2017-04-19 RX ORDER — METFORMIN HYDROCHLORIDE 500 MG/1
500 TABLET ORAL
Status: DISCONTINUED | OUTPATIENT
Start: 2017-04-20 | End: 2017-04-20 | Stop reason: HOSPADM

## 2017-04-19 RX ORDER — ONDANSETRON 2 MG/ML
INJECTION INTRAMUSCULAR; INTRAVENOUS AS NEEDED
Status: DISCONTINUED | OUTPATIENT
Start: 2017-04-19 | End: 2017-04-19 | Stop reason: HOSPADM

## 2017-04-19 RX ORDER — CEFAZOLIN SODIUM IN 0.9 % NACL 2 G/100 ML
2 PLASTIC BAG, INJECTION (ML) INTRAVENOUS ONCE
Status: COMPLETED | OUTPATIENT
Start: 2017-04-19 | End: 2017-04-19

## 2017-04-19 RX ORDER — MIDAZOLAM HYDROCHLORIDE 1 MG/ML
INJECTION, SOLUTION INTRAMUSCULAR; INTRAVENOUS AS NEEDED
Status: DISCONTINUED | OUTPATIENT
Start: 2017-04-19 | End: 2017-04-19 | Stop reason: HOSPADM

## 2017-04-19 RX ADMIN — MIDAZOLAM HYDROCHLORIDE 2 MG: 1 INJECTION, SOLUTION INTRAMUSCULAR; INTRAVENOUS at 07:31

## 2017-04-19 RX ADMIN — FAMOTIDINE 20 MG: 20 TABLET ORAL at 18:15

## 2017-04-19 RX ADMIN — OXYCODONE HYDROCHLORIDE 10 MG: 5 TABLET ORAL at 15:32

## 2017-04-19 RX ADMIN — ACETAMINOPHEN 1000 MG: 500 TABLET ORAL at 06:37

## 2017-04-19 RX ADMIN — OXYCODONE HYDROCHLORIDE 10 MG: 5 TABLET ORAL at 12:46

## 2017-04-19 RX ADMIN — ALBUMIN HUMAN 250 ML: 50 SOLUTION INTRAVENOUS at 08:49

## 2017-04-19 RX ADMIN — PROPOFOL 25 MCG/KG/MIN: 10 INJECTION, EMULSION INTRAVENOUS at 07:49

## 2017-04-19 RX ADMIN — CELECOXIB 200 MG: 200 CAPSULE ORAL at 06:37

## 2017-04-19 RX ADMIN — VANCOMYCIN HYDROCHLORIDE 1500 MG: 10 INJECTION, POWDER, LYOPHILIZED, FOR SOLUTION INTRAVENOUS at 06:42

## 2017-04-19 RX ADMIN — ACETAMINOPHEN 650 MG: 325 TABLET, FILM COATED ORAL at 18:15

## 2017-04-19 RX ADMIN — CEFAZOLIN 2 G: 10 INJECTION, POWDER, FOR SOLUTION INTRAVENOUS; PARENTERAL at 07:34

## 2017-04-19 RX ADMIN — ACETAMINOPHEN 650 MG: 325 TABLET, FILM COATED ORAL at 23:21

## 2017-04-19 RX ADMIN — ACETAMINOPHEN 650 MG: 325 TABLET, FILM COATED ORAL at 12:46

## 2017-04-19 RX ADMIN — FAMOTIDINE 20 MG: 20 TABLET ORAL at 12:46

## 2017-04-19 RX ADMIN — HYDROMORPHONE HYDROCHLORIDE 0.5 MG: 1 INJECTION, SOLUTION INTRAMUSCULAR; INTRAVENOUS; SUBCUTANEOUS at 10:04

## 2017-04-19 RX ADMIN — KETAMINE HYDROCHLORIDE 20 MG: 100 INJECTION, SOLUTION INTRAMUSCULAR; INTRAVENOUS at 08:20

## 2017-04-19 RX ADMIN — REGULAR STRENGTH 325 MG: 325 TABLET ORAL at 12:46

## 2017-04-19 RX ADMIN — BUPIVACAINE HYDROCHLORIDE 1.6 ML: 7.5 INJECTION, SOLUTION EPIDURAL; RETROBULBAR at 07:45

## 2017-04-19 RX ADMIN — KETOROLAC TROMETHAMINE 15 MG: 30 INJECTION, SOLUTION INTRAMUSCULAR at 18:15

## 2017-04-19 RX ADMIN — ONDANSETRON 4 MG: 2 INJECTION INTRAMUSCULAR; INTRAVENOUS at 07:31

## 2017-04-19 RX ADMIN — KETAMINE HYDROCHLORIDE 30 MG: 100 INJECTION, SOLUTION INTRAMUSCULAR; INTRAVENOUS at 07:46

## 2017-04-19 RX ADMIN — KETAMINE HYDROCHLORIDE 30 MG: 100 INJECTION, SOLUTION INTRAMUSCULAR; INTRAVENOUS at 08:47

## 2017-04-19 RX ADMIN — PROPOFOL 50 MG: 10 INJECTION, EMULSION INTRAVENOUS at 08:19

## 2017-04-19 RX ADMIN — OXYCODONE HYDROCHLORIDE 10 MG: 5 TABLET ORAL at 23:22

## 2017-04-19 RX ADMIN — VANCOMYCIN HYDROCHLORIDE 1500 MG: 10 INJECTION, POWDER, LYOPHILIZED, FOR SOLUTION INTRAVENOUS at 18:33

## 2017-04-19 RX ADMIN — SODIUM CHLORIDE 125 ML/HR: 900 INJECTION, SOLUTION INTRAVENOUS at 10:36

## 2017-04-19 RX ADMIN — OXYCODONE HYDROCHLORIDE 10 MG: 5 TABLET ORAL at 20:57

## 2017-04-19 RX ADMIN — GLYCOPYRROLATE 0.2 MG: 0.2 INJECTION INTRAMUSCULAR; INTRAVENOUS at 07:46

## 2017-04-19 RX ADMIN — PROPOFOL 20 MG: 10 INJECTION, EMULSION INTRAVENOUS at 09:27

## 2017-04-19 RX ADMIN — KETOROLAC TROMETHAMINE 15 MG: 30 INJECTION, SOLUTION INTRAMUSCULAR at 23:21

## 2017-04-19 RX ADMIN — SODIUM CHLORIDE, SODIUM LACTATE, POTASSIUM CHLORIDE, AND CALCIUM CHLORIDE: 600; 310; 30; 20 INJECTION, SOLUTION INTRAVENOUS at 08:26

## 2017-04-19 RX ADMIN — REGULAR STRENGTH 325 MG: 325 TABLET ORAL at 18:15

## 2017-04-19 RX ADMIN — KETAMINE HYDROCHLORIDE 20 MG: 100 INJECTION, SOLUTION INTRAMUSCULAR; INTRAVENOUS at 07:59

## 2017-04-19 RX ADMIN — KETAMINE HYDROCHLORIDE 20 MG: 100 INJECTION, SOLUTION INTRAMUSCULAR; INTRAVENOUS at 09:27

## 2017-04-19 RX ADMIN — OXYCODONE HYDROCHLORIDE 10 MG: 5 TABLET ORAL at 18:15

## 2017-04-19 RX ADMIN — SODIUM CHLORIDE, SODIUM LACTATE, POTASSIUM CHLORIDE, AND CALCIUM CHLORIDE 25 ML/HR: 600; 310; 30; 20 INJECTION, SOLUTION INTRAVENOUS at 06:31

## 2017-04-19 RX ADMIN — DOCUSATE SODIUM AND SENNOSIDES 1 TABLET: 8.6; 5 TABLET, FILM COATED ORAL at 18:15

## 2017-04-19 RX ADMIN — KETAMINE HYDROCHLORIDE 30 MG: 100 INJECTION, SOLUTION INTRAMUSCULAR; INTRAVENOUS at 09:59

## 2017-04-19 RX ADMIN — ALBUMIN HUMAN 250 ML: 50 SOLUTION INTRAVENOUS at 09:24

## 2017-04-19 NOTE — ANESTHESIA POSTPROCEDURE EVALUATION
Post-Anesthesia Evaluation and Assessment    Patient: Barrington Cota MRN: [id number]  SSN: [id number]    YOB: 1965  Age: 46 y.o. Sex: female       Cardiovascular Function/Vital Signs  Visit Vitals    /60    Pulse (!) 55    Temp 36.6 °C (97.9 °F)    Resp 15    Ht 5' 7.5\" (1.715 m)    Wt 116.5 kg (256 lb 13.4 oz)    SpO2 99%    BMI 39.63 kg/m2       Patient is status post spinal anesthesia for Procedure(s):  LEFT TOTAL KNEE REPLACEMENT. Nausea/Vomiting: None    Postoperative hydration reviewed and adequate. Pain:  Pain Scale 1: Numeric (0 - 10) (04/19/17 1100)  Pain Intensity 1: 0 (04/19/17 1100)   Managed    Neurological Status:   Neuro (WDL): Within Defined Limits (04/19/17 0709)  Neuro  Neurologic State: Alert (04/19/17 1020)  Orientation Level: Oriented to person;Oriented to place;Oriented to situation (04/19/17 1020)  Cognition: Follows commands (04/19/17 1020)  Speech: Clear (04/19/17 1020)  LUE Motor Response: Purposeful (04/19/17 1020)  LLE Motor Response: Numbness (04/19/17 1020)  RUE Motor Response: Purposeful (04/19/17 1020)  RLE Motor Response: Numbness (04/19/17 1020)   At baseline    Mental Status and Level of Consciousness: Arousable    Pulmonary Status:   O2 Device: Nasal cannula (04/19/17 1020)   Adequate oxygenation and airway patent    Complications related to anesthesia: None    Post-anesthesia assessment completed.  No concerns    Signed By: Stan Lopez MD     April 19, 2017

## 2017-04-19 NOTE — PROGRESS NOTES
Problem: Mobility Impaired (Adult and Pediatric)  Goal: *Acute Goals and Plan of Care (Insert Text)  Physical Therapy Goals  Initiated 4/19/2017    1. Patient will move from supine to sit and sit to supine , scoot up and down and roll side to side in bed with modified independence within 4 days. 2. Patient will perform sit to stand with modified independence within 4 days. 3. Patient will ambulate with modified independence for 250 feet with the least restrictive device within 4 days. 4. Patient will ascend/descend 3 stairs with bilateral handrail(s) with modified independence within 4 days. 5. Patient will perform home exercise program per protocol with independence within 4 days. 6. Patient will demonstrate AROM 0-90 degrees in operative joint within 4 days. PHYSICAL THERAPY KNEE EVALUATION  Patient: Maurilio Boogie (5 y.o. female)  Date: 4/19/2017  Primary Diagnosis: DJD  Arthritis of knee, left  Primary localized osteoarthritis of left knee  Procedure(s) (LRB):  LEFT TOTAL KNEE REPLACEMENT (Left) Day of Surgery   Precautions:   WBAT      ASSESSMENT :  Based on the objective data described below, the patient presents with impaired functional mobility secondary to increased L knee pain in addition to expected post-op decreased strength and ROM on POD 0 following L TKA. Pt received supine in bed with multiple family members present and pt agreeable to participation with therapy. Pt assumed seated position EOB at supervision level w/ intact sitting balance once EOB. Remaining functional mobility occurred with CGA including sit<>stand transfers (from EOB and from commode) as well as ambulation of 65ft w/ use of RW. Pt exhibited step-to, antalgic gait pattern with increased BUE support on RW frame noted. No LOB/balance deficits noted however pt c/o 8/10 pain in L knee during ambulation and was unable to correct impaired gait mechanics. All VSS throughout on RA.  Anticipate that pt will continue to progress well with therapy and be appropriate to discharge home w/ [place] [facility] and assist of family. Pt will need RW upon discharge as she does not own any DME. Patient will benefit from skilled intervention to address the above impairments. Patients rehabilitation potential is considered to be Good  Factors which may influence rehabilitation potential include:   [ ]         None noted  [ ]         Mental ability/status  [ ]         Medical condition  [ ]         Home/family situation and support systems  [ ]         Safety awareness  [X]         Pain tolerance/management  [ ]         Other:        PLAN :  Recommendations and Planned Interventions:  [X]           Bed Mobility Training             [ ]    Neuromuscular Re-Education  [X]           Transfer Training                   [ ]    Orthotic/Prosthetic Training  [X]           Gait Training                         [ ]    Modalities  [X]           Therapeutic Exercises           [ ]    Edema Management/Control  [X]           Therapeutic Activities            [X]    Patient and Family Training/Education  [X]           Other (comment): stair climbing     Frequency/Duration: Patient will be followed by physical therapy twice daily to address goals. Discharge Recommendations: Home Health  Further Equipment Recommendations for Discharge: rolling walker       SUBJECTIVE:   Patient stated I'm going home tomorrow. Jennifer Louis      OBJECTIVE DATA SUMMARY:   HISTORY:    Past Medical History:   Diagnosis Date    Arthritis      Deep vein thrombosis (DVT) (HCC)       left leg    Hypertension      Ill-defined condition       high cholesterol    Ill-defined condition       non healing vein ulcer    MI (myocardial infarction) ([facility] [address]       2 stents     Past Surgical History:   Procedure Laterality Date    CARDIAC SURG PROCEDURE UNLIST         x2 stents    HX  SECTION        VASCULAR SURGERY PROCEDURE UNLIST         angioplasty     Prior Level of Function/Home Situation: Independent w/ household ambulation however use of SPC during community ambulation. [place] with ADLs and denies history of falls. 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 Lives with  who will be assisting at discharge. Denies SOB with daily activity   Personal factors and/or comorbidities impacting plan of care: none noted      Home Situation  Home Environment: Private residence  # Steps to Enter: 3  Rails to Enter: Yes  Hand Rails : Bilateral  Wheelchair Ramp: No  One/Two Story Residence: One story  Living Alone: No  Support Systems: Spouse/Significant Other/Partner, Family member(s)  Patient Expects to be Discharged to[de-identified] Private residence  Current DME Used/Available at Home: Cane, straight  Tub or Shower Type: Tub/Shower combination     EXAMINATION/PRESENTATION/DECISION MAKING:   Critical Behavior:  Neurologic State: Alert  Orientation Level: Oriented X4  Cognition: Follows commands     Hearing: Auditory  Auditory Impairment: None  Hearing Aids/Status: Does not own  Skin:  Dressing clean, dry, intact   Range Of Motion:  AROM: Generally decreased, functional                       Strength:    Strength: Generally decreased, functional                    Tone & Sensation:   Tone: Normal              Sensation: Intact               Coordination:  Coordination: Within functional limits  Vision:      Functional Mobility:  Bed Mobility:  Rolling: Supervision  Supine to Sit: Supervision  Sit to Supine: Supervision  Scooting: Supervision  Transfers:  Sit to Stand: Contact guard assistance  Stand to Sit: Contact guard assistance                       Balance:   Sitting: Intact  Standing: Intact; With support  Ambulation/Gait Training:  Distance (ft): 65 Feet (ft)  Assistive Device: Walker, rolling;Gait belt  Ambulation - Level of Assistance: Contact guard assistance        Gait Abnormalities: Antalgic; Step to gait        Base of Support: Widened     Speed/Betina: Pace decreased (<100 feet/min) Step-to, antalgic gait pattern however steady gait with use of RW and CGA. Functional Measure:  Barthel Index:      Bathin  Bladder: 10  Bowels: 10  Groomin  Dressin  Feeding: 10  Mobility: 0  Stairs: 0  Toilet Use: 5  Transfer (Bed to Chair and Back): 10  Total: 55         Barthel and G-code impairment scale:  Percentage of impairment CH  0% CI  1-19% CJ  20-39% CK  40-59% CL  60-79% CM  80-99% CN  100%   Barthel Score 0-100 100 99-80 79-60 59-40 20-39 1-19    0   Barthel Score 0-20 20 17-19 13-16 9-12 5-8 1-4 0      The Barthel ADL Index: Guidelines  1. The index should be used as a record of what a patient does, not as a record of what a patient could do. 2. The main aim is to establish degree of independence from any help, physical or verbal, however minor and for whatever reason. 3. The need for supervision renders the patient not independent. 4. A patient's performance should be established using the best available evidence. Asking the patient, friends/relatives and nurses are the usual sources, but direct observation and common sense are also important. However direct testing is not needed. 5. Usually the patient's performance over the preceding 24-48 hours is important, but occasionally longer periods will be relevant. 6. Middle categories imply that the patient supplies over 50 per cent of the effort. 7. Use of aids to be independent is allowed. Yusra Byrne., Barthel, D.W. (7521). Functional evaluation: the Barthel Index. [address] [facility] (14)2. SHEYLA Galvez, Tania Adjutant., Viridiana Hawk., Christian, [facility] (). Measuring the change indisability after inpatient rehabilitation; comparison of the responsiveness of the Barthel Index and Functional Sullivan Measure. Journal of Neurology, Neurosurgery, and Psychiatry, 66(4), 329-272.   LOWELL Dominguez, TON Pro, & Andry Venegas, M.A. (2004.) Assessment of post-stroke quality of life in cost-effectiveness studies: The usefulness of the Barthel Index and the EuroQoL-5D. Quality of Life Research, 13, 335-76         G codes: In compliance with CMSs Claims Based Outcome Reporting, the following G-code set was chosen for this patient based on their primary functional limitation being treated: The outcome measure chosen to determine the severity of the functional limitation was the Barthel Index with a score of 55/100 which was correlated with the impairment scale. · Mobility - Walking and Moving Around:               - CURRENT STATUS:    CK - 40%-59% impaired, limited or restricted               - GOAL STATUS:           CI - 1%-19% impaired, limited or restricted               - D/C STATUS:                       ---------------To be determined---------------         Physical Therapy Evaluation Charge Determination   History Examination Presentation Decision-Making   LOW Complexity : Zero comorbidities / personal factors that will impact the outcome / POC LOW Complexity : 1-2 Standardized tests and measures addressing body structure, function, activity limitation and / or participation in recreation  LOW Complexity : Stable, uncomplicated  LOW Complexity : FOTO score of       Based on the above components, the patient evaluation is determined to be of the following complexity level: LOW      Pain:  Pain Scale 1: Numeric (0 - 10)  Pain Intensity 1: 8  Pain Location 1: Knee  Pain Orientation 1: Left  Pain Description 1: Aching  Pain Intervention(s) 1: Medication (see MAR)  Activity Tolerance:   VSS throughout on RA  Please refer to the flowsheet for vital signs taken during this treatment.   After treatment:   [ ]         Patient left in no apparent distress sitting up in chair  [X]         Patient left in no apparent distress in bed  [X]         Call bell left within reach  [X]         Nursing notified  [X]         Caregiver present  [ ]         Bed alarm activated COMMUNICATION/EDUCATION:   The patients plan of care was discussed with: Registered Nurse.  [X]         Fall prevention education was provided and the patient/caregiver indicated understanding. [X]         Patient/family have participated as able in goal setting and plan of care. [X]         Patient/family agree to work toward stated goals and plan of care. [ ]         Patient understands intent and goals of therapy, but is neutral about his/her participation. [ ]         Patient is unable to participate in goal setting and plan of care.      Thank you for this referral.  Sunni Reyez, PT, DPT   Time Calculation: 23 mins

## 2017-04-19 NOTE — H&P
Date of Surgery Update:  Micah Wnag was seen and examined. History and physical has been reviewed. The patient has been examined.  There have been no significant clinical changes since the completion of the originally dated History and Physical.    Signed By: Kellie Phillips MD     April 19, 2017 7:25 AM

## 2017-04-19 NOTE — ANESTHESIA PROCEDURE NOTES
Spinal Block    Start time: 4/19/2017 7:32 AM  End time: 4/19/2017 7:41 AM  Performed by: Jay Leija  Authorized by: Jay Leija     Pre-procedure: Indications: primary anesthetic  Preanesthetic Checklist: patient identified, risks and benefits discussed, anesthesia consent, site marked, patient being monitored and timeout performed      Spinal Block:   Patient Position:  Seated    Prep: chlorhexidine      Location:  L3-4  Technique:  Single shot  Local:  Lidocaine 1%  Local Dose (mL):  5    Needle:   Needle Type:  Pencil-tip  Needle Gauge:  25 G  Attempts:  1      Events: CSF confirmed, no blood with aspiration and no paresthesia        Assessment:  Insertion:  Uncomplicated  Patient tolerance:  Patient tolerated the procedure well with no immediate complications  Spinal Procedure Note    Risk and benefits were discussed with the patient and plans are to proceed. Patient was placed in the seated position after entering the Operating Room. The back was prepped at the lumbar region with Duraprep. 3 mL 1%  Lidocaine W/epi was used as local.    A 25 g pencil point spinal needle was placed using CSE technique @ L3 - L4 and advanced until CSF was obtained. 1.6 mL 0.75% Spinal Marcaine with dextrose  was deposited into the CSF. There were no complications during the procedure.

## 2017-04-19 NOTE — OP NOTES
[facility]   [address] [facility], [address]   OP NOTE       Name:  Keith Muniz   MR#:  [id number]   :  1965   Account #:  [de-identified]    Surgery Date:  2017   Date of Adm:  2017       PREOPERATIVE DIAGNOSIS:  Severe left knee osteoarthritis. POSTOPERATIVE DIAGNOSIS:  Severe left knee osteoarthritis. PROCEDURES PERFORMED:  Left total knee arthroplasty. SURGEON: Sherri Moscoso. Thressa Dakin, MD    FIRST ASSISTANT: Raffy Major RN    ESTIMATED BLOOD LOSS: 100 mL. SPECIMENS REMOVED: None. ANESTHESIA:  Spinal.    COMPONENTS: [employer] knee system, 7 left femoral   components, size 5, tibial base plate, 38 patella, size 7 mm thick   posterior stabilized poly. DESCRIPTION OF PROCEDURE: The patient was brought to the   operating room following administration of spinal anesthetic. A   tourniquet was placed on the left upper thigh, and the left knee and   lower leg were prepped and draped in a sterile fashion. Site confirmation was carried out. The tourniquet was not used during   the case. An anterior knee incision followed by a medial parapatellar   Arthrotomy was carried out. She was very stiff and required   considerable soft tissue release for exposure. The menisci and anterior   and posterior cruciates were debrided and immediate release carried   out. Drill holes were placed in the proximal tibia and distal femur in line   with the canal. The canal was suction and irrigated. IM kathleen   was introduced in the tibia for alignment preference, and the external   tibial cutting guide was applied with 0 degree bushing and the proximal   tibial bone cut made and the bone plate removed. Attention was turned to the femur, where using the 5 degree left valgus   bushing. This was set to take 10 mm  distally. The distal femoral cut was   made. The femur was sized at a 7.  Drill holes with 5 degrees of   external rotation were drilled and a finishing block applied. The anterior,   posterior and chamfer cuts were made. Notch cutting guide applied and   a notch cut made. At this time, remnant osteophytes and menisci were   debrided. The posterior capsule was injected with half our mixture of   0.25% Marcaine with epinephrine solution, 30 mL of saline and 30 of   Toradol. The other half was injected into the subcutaneous tissue at the   end of the case. Attention was turned to the tibia, where it was felt a size 5 tibial tray fit   the best. This was   followed by keel punch. Fluoroscopy was carried out and it   was felt that a 7 mm poly would be best. The patella was inverted and   a 9.5 mm posterior patellar cut was taken and the 3 patellar drill holes   for a 38 patellar were drilled. Patellar tracking was good. All trials were   removed. Vacuum mixed cement with 2 grams of vancomycin in it was   used to cement in the above components after pulse lavage and drying   of bone. Once the cement was dry, excess bits of cement removed   from around the prosthetic components. The permanent 7-mm   posterior stabilized poly was impacted into place. A medium Hemovac   drain was placed. The capsule was closed with figure of eight #1 Vicryl   suture and a running #1 Stratafix suture. Subcutaneous tissue was   closed with 2-0 Vicryl suture and 2-0 Stratafix and the skin closed with   skin staples. She was dressed with Adaptic, 4 x 4s, ABDs and sterile   cast padding and Ace wrap. She was taken to the recovery room in   stable condition.         Chilo Cloe MD      [facility] / [facility]   D:  04/19/2017   11:08   T:  04/19/2017   12:32   Job #:  [id number]

## 2017-04-19 NOTE — IP AVS SNAPSHOT
[address] [facility] 
[phone] Patient: Lori Michelle MRN: [id number] :1965 You are allergic to the following Allergen Reactions Cortisone Itching Miralax (Polyethylene Glycol 3350) Itching Recent Documentation Height Weight BMI OB Status Smoking Status 1.715 m 116.5 kg 39.63 kg/m2 Having regular periods Former Smoker Emergency Contacts Name Discharge Info Relation Home Work Roamler,Hillary DISCHARGE CAREGIVER [3] Friend [5] [phone] Pierre Godfrey  Spouse [3] [phone] About your hospitalization You were admitted on:  2017 You last received care in the:  [facility] 3 ORTHOPEDICS You were discharged on:  2017 Unit phone number:  [phone] Why you were hospitalized Your primary diagnosis was:  Not on File Your diagnoses also included: Arthritis Of Knee, Left, Primary Localized Osteoarthritis Of Left Knee Providers Seen During Your Hospitalizations Provider Role Specialty Primary office phone Jeff Walker MD Attending Provider Orthopedic Surgery [phone] Your Primary Care Physician (PCP) Primary Care Physician Office Phone Office Fax OTHER, PHYS ** None ** ** None ** Follow-up Information Follow up With Details Comments Contact Info Jeff Walker MD On 2017 @ 2:50 pm [facility] [address] [facility] 
[phone] Phys MD Ruperto   Patient can only remember the practice name and not the physician [facility] On 2017 This is the provider of your equipment. Please contact them with any questions. [address] [place] [id number] 
[phone] Current Discharge Medication List  
  
START taking these medications Dose & Instructions Dispensing Information Comments Morning Noon Evening Bedtime  
 acetaminophen 325 mg tablet Commonly known as:  TYLENOL Your last dose was: Your next dose is:    
   
   
 Dose:  650 mg Take 2 Tabs by mouth every six (6) hours for 14 days. Quantity:  112 Tab Refills:  0  
     
   
   
   
  
 famotidine 20 mg tablet Commonly known as:  PEPCID Your last dose was: Your next dose is:    
   
   
 Dose:  20 mg Take 1 Tab by mouth two (2) times a day for 30 days. Quantity:  60 Tab Refills:  0  
     
   
   
   
  
 oxyCODONE IR 5 mg immediate release tablet Commonly known as:  Mary Jennings Your last dose was: Your next dose is:    
   
   
 Dose:  5-10 mg Take 1-2 Tabs by mouth every three (3) hours as needed. Max Daily Amount: 80 mg.  
 Quantity:  80 Tab Refills:  0  
     
   
   
   
  
 polyethylene glycol 17 gram packet Commonly known as:  Beuford Hallmark Your last dose was: Your next dose is:    
   
   
 Dose:  17 g Take 1 Packet by mouth daily as needed (constipation) for up to 15 days. Quantity:  15 Packet Refills:  0  
     
   
   
   
  
 senna-docusate 8.6-50 mg per tablet Commonly known as:  Vanessa Like Your last dose was: Your next dose is:    
   
   
 Dose:  1 Tab Take 1 Tab by mouth daily. Quantity:  30 Tab Refills:  0 CONTINUE these medications which have CHANGED Dose & Instructions Dispensing Information Comments Morning Noon Evening Bedtime  
 aspirin delayed-release 325 mg tablet What changed:   
- medication strength 
- how much to take - when to take this Your last dose was: Your next dose is:    
   
   
 Dose:  325 mg Take 1 Tab by mouth two (2) times a day for 30 days. Quantity:  60 Tab Refills:  0 CONTINUE these medications which have NOT CHANGED Dose & Instructions Dispensing Information Comments Morning Noon Evening Bedtime CeleXA 20 mg tablet Generic drug:  citalopram  
   
Your last dose was: Your next dose is:    
   
   
 Dose:  20 mg Take 20 mg by mouth daily. Refills:  0  
     
   
   
   
  
 flaxseed oil 1,000 mg Cap Your last dose was: Your next dose is:    
   
   
 Dose:  1 Tab Take 1 Tab by mouth daily. Refills:  0  
     
   
   
   
  
 furosemide 20 mg tablet Commonly known as:  LASIX Your last dose was: Your next dose is:    
   
   
 Dose:  20 mg Take 20 mg by mouth daily. Refills:  0 LIPITOR 40 mg tablet Generic drug:  atorvastatin Your last dose was: Your next dose is:    
   
   
 Dose:  40 mg Take 40 mg by mouth daily. Refills:  0  
     
   
   
   
  
 lisinopril-hydroCHLOROthiazide 20-25 mg per tablet Commonly known as:  Wonda Cone Your last dose was: Your next dose is:    
   
   
 Dose:  1 Tab Take 1 Tab by mouth daily. Refills:  0  
     
   
   
   
  
 meloxicam 15 mg tablet Commonly known as:  MOBIC Your last dose was: Your next dose is:    
   
   
 Dose:  15 mg Take 15 mg by mouth daily. Refills:  0  
     
   
   
   
  
 metFORMIN 500 mg tablet Commonly known as:  GLUCOPHAGE Your last dose was: Your next dose is:    
   
   
 Dose:  500 mg Take 500 mg by mouth daily (with breakfast). Refills:  0  
     
   
   
   
  
 metoprolol tartrate 25 mg tablet Commonly known as:  LOPRESSOR Your last dose was: Your next dose is:    
   
   
 Dose:  25 mg Take 25 mg by mouth daily. Refills:  0 STOOL SOFTENER 100 mg capsule Generic drug:  docusate sodium Your last dose was: Your next dose is:    
   
   
 Dose:  100 mg Take 100 mg by mouth two (2) times daily as needed for Constipation. Refills:  0 STOP taking these medications HYDROcodone-acetaminophen 5-325 mg per tablet Commonly known as:  NORCO  
   
  
 traMADol 50 mg tablet Commonly known as:  ULTRAM  
   
  
  
  
Where to Get Your Medications Information on where to get these meds will be given to you by the nurse or doctor. ! Ask your nurse or doctor about these medications  
  acetaminophen 325 mg tablet  
 aspirin delayed-release 325 mg tablet  
 famotidine 20 mg tablet  
 oxyCODONE IR 5 mg immediate release tablet  
 polyethylene glycol 17 gram packet  
 senna-docusate 8.6-50 mg per tablet Discharge Instructions Mario Skinner Surgery: Total Knee Replacement Surgeon:   Juany Robles MD 
Surgery Date:  4/19/2017 ? To relieve pain: ? Use ice/gel packs. ? Put the ice pack directly over the wound, or anywhere you are hurting or swollen. ? To control pain and swelling, keep ice on regularly, especially after physical activity. ? The packs should stay cold for 3-4 hours. When it is not cold anymore, rotate with the packs in the freezer. ? Elevate your leg. This will also keep swelling down. ? Rest for at least 20 minutes between activity or exercises. ? To keep track of your pain medications, write down what you take and when you take it. ? The last dose of pain medication you got in the hospital was:    
 
Medication Dose Date & Time ? Choose your medications based on the pain scale below: ? To keep your pain under control, take Tylenol every 6 hours for 14 days - even if you feel like you dont need it. ? For mild to moderate pain (1-6 on pain scale), take one pain pill every 3-4 hours as needed. ? For severe pain (7-10 on pain scale), take two pain pills every 3-4 hours as needed. ? To prevent nausea, take your pain medications with food. Pain Scale ? As your pain lessens: 
 
? Slowly start taking less pain medication. You may do this by waiting longer between doses or by taking smaller doses. ? Stop using the pain medications as soon as you no longer need it, usually in 2-3 weeks. ? Aspirin ? To prevent blood clots, you will need to take Aspirin 325 mg twice a day for 30 days. ? To prevent stomach upset or bleeding: ? Do not take non-steroidal anti-inflammatory medications (Ibuprofen, Advil, Motrin, Naproxen, etc.) ? Take Pepcid 20 mg twice a day, or a similar home medication, while you are taking a blood thinner. OPSITE (Honeycomb dressing) ? Keep your clear, waterproof dressing in place for 5-7 days after your leave the hospital. 
 
? If you are still having drainage, you will need to change your dressing in 5-7 days. You will be given one extra dressing to use at home. ? If there is no more drainage from the wound, you may leave it open to the air. OPSITE DRESSING INSTRUCTIONS ? To increase and promote healing: 
? Stop Smoking (or at least cut back on 
     Smoking). ? Eat a well-balanced diet (high in protein 
     and vitamin C). ? If you have a poor appetite, drink Ensure, Glucerna, or  
      [place] Instant Breakfast for the next 30 days. ? If you are diabetic, you should control your blood  
      sugars to prevent infection and help your wound  
      to heal. 
               
 
 
 
? To prevent constipation, stay active and drink plenty of fluid. ? While using pain medications, you should also take stool softeners and laxatives, such as Pericolace 
     and Miralax. ? If you are having too many bowel Movements, then you may need 
     to stop taking the laxatives. ? You should have a bowel movement 3-4 days after surgery and then at least every other day while 
     on pain medication. ? To improve your recovery, you must be active! ? Use your walker and take short walks (in your home)  
      about every 2 hours during the day. ? Try to increase how far you walk each day. ? You can put as much weight on your leg as you can tolerate while walking. ? To avoid getting a stiff knee, work on getting your knee bent and straight as soon as possible. ? Home health physical therapy will come to your 
      home a few times per week to teach you how to 
      get out of bed, to safely walk in your home, and 
      to do your exercises. ? NO DRIVING until your surgeon tells you it is ok. 
 
? You can return to work when cleared by a physician. ? Please call your physician immediately if you have: 
 
? Constant bleeding from your wound. ? Increasing redness or swelling around your wound (Some warmth, bruising and swelling is normal). ? Change in wound drainage (increase in amount, color, or bad smell). ? Change in mental status (unusual behavior ? Temperature over 101.5 degrees Fahrenheit ? Pain or redness in the calf (back of your lower leg  see picture) ? Increased swelling of the thigh, ankle, calf, or foot. ? Emergency: CALL 911 if you have: 
 
? Shortness of breath ? Chest pain when you cough or taking a deep breath ? Please call your surgeons office at [phone] for a follow up appointment. _ 
? Your follow up appointment is May 1st at 2:50 pm.  
              
 
? If you have questions or concerns during normal business hours, you may reach Dr. Gulshan Peña team at [phone]. Discharge Orders None [place] Announcement We are excited to announce that we are making your provider's discharge notes available to you in [employer].   You will see these notes when they are completed and signed by the physician that discharged you from your recent hospital stay. If you have any questions or concerns about any information you see in [employer], please call the Health Information Department where you were seen or reach out to your Primary Care Provider for more information about your plan of care. Introducing [facility]! Christine Barrett introduces [employer] patient portal. Now you can access parts of your medical record, email your doctor's office, and request medication refills online. 1. In your internet browser, go to [email or web address] 2. Click on the First Time User? Click Here link in the Sign In box. You will see the New Member Sign Up page. 3. Enter your [employer] Access Code exactly as it appears below. You will not need to use this code after youve completed the sign-up process. If you do not sign up before the expiration date, you must request a new code. · [employer] Access Code: [email or web address] Expires: 7/3/2017 11:33 AM 
 
4. Enter the last four digits of your Social Security Number (xxxx) and Date of Birth (mm/dd/yyyy) as indicated and click Submit. You will be taken to the next sign-up page. 5. Create a [employer] ID. This will be your [employer] login ID and cannot be changed, so think of one that is secure and easy to remember. 6. Create a [employer] password. You can change your password at any time. 7. Enter your Password Reset Question and Answer. This can be used at a later time if you forget your password. 8. Enter your e-mail address. You will receive e-mail notification when new information is available in [address] 9. Click Sign Up. You can now view and download portions of your medical record. 10. Click the Download Summary menu link to download a portable copy of your medical information. If you have questions, please visit the Frequently Asked Questions section of the [employer] website. Remember, [employer] is NOT to be used for urgent needs. For medical emergencies, dial 911. Now available from your iPhone and Android! General Information Please provide this summary of care documentation to your next provider. Patient Signature:  ____________________________________________________________ Date:  ____________________________________________________________  
  
Margaret Dany Provider Signature:  ____________________________________________________________ Date:  ____________________________________________________________

## 2017-04-19 NOTE — BRIEF OP NOTE
BRIEF OPERATIVE NOTE    Date of Procedure: 4/19/2017   Preoperative Diagnosis: LEFT KNEE OSTEOARTHRITIS  Postoperative Diagnosis: LEFT KNEE OSTEOARTHRITIS    Procedure(s):  LEFT TOTAL KNEE REPLACEMENT  Surgeon(s) and Role:     * Hemant Lopez MD - Primary [address] [address] [address]            Surgical Staff:  Circ-1: Dori Viera RN  Scrub Tech-1: Bud Lee  Scrub Tech-2: Lovely Gotti White  Scrub RN-1: Huong Cordon RN  Float Staff: Ayad Canales RN  Event Time In   Incision Start 0813   Incision Close      Anesthesia: Spinal   Estimated Blood Loss: 400 cc  Specimens: * No specimens in log *   Findings: DJD   Complications: None  Implants:   Implant Name Type Inv.  Item Serial No.  Lot No. LRB No. Used Action   CEMENT BNE MV SMARTSET 40GM --  - SN/A  CEMENT BNE MV SMARTSET 40GM --  N/A [facility] [employer] [id number] Left 1 Implanted   CEMENT BNE MV SMARTSET 40GM --  - SN/A  CEMENT BNE MV SMARTSET 40GM --  N/A [facility] [employer] [id number] Left 1 Implanted   FEM PS SZ 7 LT BYRON -- ATTUNE - SN/A  FEM PS SZ 7 LT BYRON -- ATTUNE [facility]A [facility] [id number] Left 1 Implanted   BASE TIB FB SZ 5 BYRON -- ATTUNE - SN/A  BASE TIB FB SZ 5 BYRON -- ATTUNE N/A [facility] [id number] Left 1 Implanted   PAT BYRON DOME MEDIAL 38MM -- ATTUNE - SN/A  PAT BYRON DOME MEDIAL 38MM -- ATTUNE N/A [facility] [id number] Left 1 Implanted   INSERT TIB FB PS SZ 7 7MM -- ATTUNE - SN/A   INSERT TIB FB PS SZ 7 7MM -- ATTUNE N[facility]A [facility] [id number] Left 1 Implanted

## 2017-04-19 NOTE — ROUTINE PROCESS
Primary Nurse Samina Edwards RN and Marcus Roman RN performed a dual skin assessment on this patient No impairment noted. Hadley score is 20.

## 2017-04-19 NOTE — PERIOP NOTES
Handoff Report from Operating Room to PACU    Report received from Rylan Loera and [facility] CRNA regarding Miriam Monge. Surgeon(s):  Omer Sam MD  And Procedure(s) (LRB):  LEFT TOTAL KNEE REPLACEMENT (Left)  confirmed   with allergies, drains and dressings discussed. Anesthesia type, drugs, patient history, complications, estimated blood loss, vital signs, intake and output, and last pain medication were reviewed.         10:30 glasses returned to pt in pacu

## 2017-04-19 NOTE — PROGRESS NOTES
Ortho/ NeuroSurgery NP Note    POD# 0  s/p LEFT TOTAL KNEE REPLACEMENT     Pt resting in bed. No complaints. A&O x4   VSS Afebrile. Patient has had something to eat. No nausea. Most Recent Labs:   Lab Results   Component Value Date/Time    HGB 12.1 04/11/2017 09:24 AM    INR 1.0 04/11/2017 09:24 AM    Hemoglobin A1c 5.6 04/11/2017 09:24 AM       MRSA Screen Pre-op Negative  U/A Screen Pre-Op Negative    Body mass index is 39.63 kg/(m^2). BMI greater than 30 is classified as obesity. STOP BANG Score: 4    Social History: No significant history. Weller out. Patient needs to void today. Dressing c.d.i  Drain in place with copious bloody drainage. Calves soft and supple; No pain with passive stretch  Sensation and motor intact  SCDs for mechanical DVT proph    Plan:  1) PT BID starting today  2) Sidra-op Antibiotics Vancomycin and Ancef  3) Aspirin 325 mg PO BID for DVT Prophylaxis  4) Discharge plans to home with  and family likely tomorrow.      Ren Vilchis NP

## 2017-04-19 NOTE — ANESTHESIA PREPROCEDURE EVALUATION
Anesthetic History   No history of anesthetic complications            Review of Systems / Medical History  Patient summary reviewed, nursing notes reviewed and pertinent labs reviewed    Pulmonary  Within defined limits                 Neuro/Psych   Within defined limits           Cardiovascular  Within defined limits  Hypertension          CAD and cardiac stents    Exercise tolerance: >4 METS     GI/Hepatic/Renal  Within defined limits              Endo/Other  Within defined limits      Morbid obesity and arthritis     Other Findings              Physical Exam    Airway  Mallampati: II  TM Distance: 4 - 6 cm  Neck ROM: normal range of motion   Mouth opening: Normal     Cardiovascular  Regular rate and rhythm,  S1 and S2 normal,  no murmur, click, rub, or gallop             Dental  No notable dental hx       Pulmonary  Breath sounds clear to auscultation               Abdominal  GI exam deferred       Other Findings            Anesthetic Plan    ASA: 3  Anesthesia type: spinal          Induction: Intravenous  Anesthetic plan and risks discussed with: Patient

## 2017-04-19 NOTE — ROUTINE PROCESS
Bedside and Verbal shift change report given to [facility] Marlon (oncoming nurse) by Mega Chen (offgoing nurse). Report included the following information SBAR, Kardex, Intake/Output, MAR and Recent Results.

## 2017-04-19 NOTE — DISCHARGE INSTRUCTIONS
Saulhernandez Schaefer  Surgery: Total Knee Replacement  Surgeon:   Barbara Hendricks MD  Surgery Date:  4/19/2017     To relieve pain:   Use ice/gel packs.  Put the ice pack directly over the wound, or anywhere you are hurting or swollen.  To control pain and swelling, keep ice on regularly, especially after physical activity.  The packs should stay cold for 3-4 hours. When it is not cold anymore, rotate with the packs in the freezer.  Elevate your leg. This will also keep swelling down.  Rest for at least 20 minutes between activity or exercises.  To keep track of your pain medications, write down what you take and when you take it.  The last dose of pain medication you got in the hospital was:       Medication    Dose    Date & Time             Choose your medications based on the pain scale below:     To keep your pain under control, take Tylenol every 6 hours for 14 days - even if you feel like you dont need it.  For mild to moderate pain (1-6 on pain scale), take one pain pill every 3-4 hours as needed.  For severe pain (7-10 on pain scale), take two pain pills every 3-4 hours as needed.  To prevent nausea, take your pain medications with food. Pain Scale                   As your pain lessens:     Slowly start taking less pain medication. You may do this by waiting longer between doses or by taking smaller doses.  Stop using the pain medications as soon as you no longer need it, usually in 2-3 weeks.  Aspirin   To prevent blood clots, you will need to take Aspirin 325 mg twice a day for 30 days.  To prevent stomach upset or bleeding:   Do not take non-steroidal anti-inflammatory medications (Ibuprofen, Advil, Motrin, Naproxen, etc.)    Take Pepcid 20 mg twice a day, or a similar home medication, while you are taking a blood thinner.             OPSITE (Honeycomb dressing)     Keep your clear, waterproof dressing in place for 5-7 days after your leave the hospital.     If you are still having drainage, you will need to change your dressing in 5-7 days. You will be given one extra dressing to use at home.  If there is no more drainage from the wound, you may leave it open to the air. OPSITE DRESSING INSTRUCTIONS                       To increase and promote healing:   Stop Smoking (or at least cut back on       Smoking).  Eat a well-balanced diet (high in protein       and vitamin C).  If you have a poor appetite, drink Ensure, Glucerna, or         [place] Instant Breakfast for the next 30 days.  If you are diabetic, you should control your blood         sugars to prevent infection and help your wound         to heal.                         To prevent constipation, stay active and drink plenty of fluid.  While using pain medications, you should also take stool softeners and laxatives, such as Pericolace       and Miralax.  If you are having too many bowel       Movements, then you may need       to stop taking the laxatives.  You should have a bowel movement 3-4 days        after surgery and then at least every other day while       on pain medication.  To improve your recovery, you must be active!  Use your walker and take short walks (in your home)         about every 2 hours during the day.  Try to increase how far you walk each day.  You can put as much weight on your leg as you can tolerate while walking.  To avoid getting a stiff knee, work on getting your knee bent and straight as soon as possible.  Home health physical therapy will come to your        home a few times per week to teach you how to        get out of bed, to safely walk in your home, and        to do your exercises.  NO DRIVING until your surgeon tells you it is ok.      You can return to work when cleared by a physician.  Please call your physician immediately if you have:     Constant bleeding from your wound.  Increasing redness or swelling around your wound (Some warmth, bruising and swelling is normal).  Change in wound drainage (increase in amount, color, or bad smell).  Change in mental status (unusual behavior   Temperature over 101.5 degrees Fahrenheit      Pain or redness in the calf (back of your lower leg - see picture)     Increased swelling of the thigh, ankle, calf, or foot.  Emergency: CALL 911 if you have:     Shortness of breath     Chest pain when you cough or taking a deep breath     Please call your surgeons office at [phone] for a follow up appointment. _   Your follow up appointment is May 1st at 2:50 pm.                     If you have questions or concerns during normal business hours, you may reach Dr. Hina Farrell team at [phone].

## 2017-04-20 VITALS
SYSTOLIC BLOOD PRESSURE: 152 MMHG | WEIGHT: 256.84 LBS | BODY MASS INDEX: 38.93 KG/M2 | TEMPERATURE: 98.6 F | RESPIRATION RATE: 18 BRPM | HEIGHT: 68 IN | DIASTOLIC BLOOD PRESSURE: 64 MMHG | OXYGEN SATURATION: 96 % | HEART RATE: 77 BPM

## 2017-04-20 LAB
ANION GAP BLD CALC-SCNC: 7 MMOL/L (ref 5–15)
BUN SERPL-MCNC: 16 MG/DL (ref 6–20)
BUN/CREAT SERPL: 27 (ref 12–20)
CALCIUM SERPL-MCNC: 7.9 MG/DL (ref 8.5–10.1)
CHLORIDE SERPL-SCNC: 102 MMOL/L (ref 97–108)
CO2 SERPL-SCNC: 28 MMOL/L (ref 21–32)
CREAT SERPL-MCNC: 0.59 MG/DL (ref 0.55–1.02)
GLUCOSE BLD STRIP.AUTO-MCNC: 110 MG/DL (ref 65–100)
GLUCOSE BLD STRIP.AUTO-MCNC: 127 MG/DL (ref 65–100)
GLUCOSE SERPL-MCNC: 128 MG/DL (ref 65–100)
HGB BLD-MCNC: 8.9 G/DL (ref 11.5–16)
POTASSIUM SERPL-SCNC: 3.7 MMOL/L (ref 3.5–5.1)
SERVICE CMNT-IMP: ABNORMAL
SERVICE CMNT-IMP: ABNORMAL
SODIUM SERPL-SCNC: 137 MMOL/L (ref 136–145)

## 2017-04-20 PROCEDURE — 82962 GLUCOSE BLOOD TEST: CPT

## 2017-04-20 PROCEDURE — 74011250636 HC RX REV CODE- 250/636: Performed by: ORTHOPAEDIC SURGERY

## 2017-04-20 PROCEDURE — 97116 GAIT TRAINING THERAPY: CPT

## 2017-04-20 PROCEDURE — 36415 COLL VENOUS BLD VENIPUNCTURE: CPT | Performed by: ORTHOPAEDIC SURGERY

## 2017-04-20 PROCEDURE — 74011250637 HC RX REV CODE- 250/637: Performed by: ORTHOPAEDIC SURGERY

## 2017-04-20 PROCEDURE — 97110 THERAPEUTIC EXERCISES: CPT

## 2017-04-20 PROCEDURE — 85018 HEMOGLOBIN: CPT | Performed by: ORTHOPAEDIC SURGERY

## 2017-04-20 PROCEDURE — 80048 BASIC METABOLIC PNL TOTAL CA: CPT | Performed by: ORTHOPAEDIC SURGERY

## 2017-04-20 RX ORDER — ASPIRIN 325 MG
325 TABLET, DELAYED RELEASE (ENTERIC COATED) ORAL 2 TIMES DAILY
Qty: 60 TAB | Refills: 0 | Status: SHIPPED | OUTPATIENT
Start: 2017-04-20 | End: 2017-05-20

## 2017-04-20 RX ORDER — FAMOTIDINE 20 MG/1
20 TABLET, FILM COATED ORAL 2 TIMES DAILY
Qty: 60 TAB | Refills: 0 | Status: SHIPPED | OUTPATIENT
Start: 2017-04-20 | End: 2017-05-20

## 2017-04-20 RX ORDER — AMOXICILLIN 250 MG
1 CAPSULE ORAL DAILY
Qty: 30 TAB | Refills: 0 | Status: SHIPPED | OUTPATIENT
Start: 2017-04-20

## 2017-04-20 RX ORDER — OXYCODONE HYDROCHLORIDE 5 MG/1
5-10 TABLET ORAL
Qty: 80 TAB | Refills: 0 | Status: SHIPPED | OUTPATIENT
Start: 2017-04-20 | End: 2019-06-18

## 2017-04-20 RX ORDER — ACETAMINOPHEN 325 MG/1
650 TABLET ORAL EVERY 6 HOURS
Qty: 112 TAB | Refills: 0 | Status: SHIPPED | OUTPATIENT
Start: 2017-04-20 | End: 2017-05-04

## 2017-04-20 RX ORDER — POLYETHYLENE GLYCOL 3350 17 G/17G
17 POWDER, FOR SOLUTION ORAL
Qty: 15 PACKET | Refills: 0 | Status: SHIPPED | OUTPATIENT
Start: 2017-04-20 | End: 2017-05-05

## 2017-04-20 RX ADMIN — METOPROLOL TARTRATE 25 MG: 25 TABLET ORAL at 10:32

## 2017-04-20 RX ADMIN — CITALOPRAM HYDROBROMIDE 20 MG: 20 TABLET ORAL at 10:32

## 2017-04-20 RX ADMIN — OXYCODONE HYDROCHLORIDE 10 MG: 5 TABLET ORAL at 09:07

## 2017-04-20 RX ADMIN — OXYCODONE HYDROCHLORIDE 10 MG: 5 TABLET ORAL at 06:11

## 2017-04-20 RX ADMIN — FAMOTIDINE 20 MG: 20 TABLET ORAL at 10:32

## 2017-04-20 RX ADMIN — REGULAR STRENGTH 325 MG: 325 TABLET ORAL at 12:33

## 2017-04-20 RX ADMIN — FUROSEMIDE 20 MG: 20 TABLET ORAL at 10:31

## 2017-04-20 RX ADMIN — DOCUSATE SODIUM AND SENNOSIDES 1 TABLET: 8.6; 5 TABLET, FILM COATED ORAL at 10:31

## 2017-04-20 RX ADMIN — OXYCODONE HYDROCHLORIDE 10 MG: 5 TABLET ORAL at 15:01

## 2017-04-20 RX ADMIN — Medication 10 ML: at 06:11

## 2017-04-20 RX ADMIN — KETOROLAC TROMETHAMINE 15 MG: 30 INJECTION, SOLUTION INTRAMUSCULAR at 06:11

## 2017-04-20 RX ADMIN — ACETAMINOPHEN 650 MG: 325 TABLET, FILM COATED ORAL at 12:33

## 2017-04-20 RX ADMIN — OXYCODONE HYDROCHLORIDE 10 MG: 5 TABLET ORAL at 02:40

## 2017-04-20 RX ADMIN — ACETAMINOPHEN 650 MG: 325 TABLET, FILM COATED ORAL at 06:11

## 2017-04-20 RX ADMIN — METFORMIN HYDROCHLORIDE 500 MG: 500 TABLET, FILM COATED ORAL at 10:32

## 2017-04-20 RX ADMIN — OXYCODONE HYDROCHLORIDE 10 MG: 5 TABLET ORAL at 12:33

## 2017-04-20 NOTE — PROGRESS NOTES
Bedside and Verbal shift change report given to Bronwyn De La Cruz (oncoming nurse) by Judy Herzog (offgoing nurse). Report included the following information SBAR, Kardex, OR Summary, Procedure Summary, Intake/Output, MAR, Recent Results and Med Rec Status.

## 2017-04-20 NOTE — PROGRESS NOTES
Ortho / Neurosurgery NP Note    POD# 1  s/p LEFT TOTAL KNEE REPLACEMENT   Pt seen with Moises    Pt resting in bed. No complaints. Pain well controlled    VSS Afebrile. Voiding status: + void    Labs  Lab Results   Component Value Date/Time    HGB 8.9 04/20/2017 02:54 AM      Lab Results   Component Value Date/Time    INR 1.0 04/11/2017 09:24 AM        Body mass index is 39.63 kg/(m^2). : A BMI >30 is classified as Obesity. Dressing c.d.i  Cryotherapy in place over incision  Drain removed this morning  Calves soft and supple; No pain with passive stretch  Sensation and motor intact  SCDs for mechanical DVT proph while in bed     PLAN:  1) PT BID  2) Aspirin 325 mg PO BID for DVT Prophylaxis    3) Plan d/c home today if progressing well with PT.     Garrett Almeida, YULI

## 2017-04-20 NOTE — PROGRESS NOTES
Discharge instructions discussed with the patient and her . Prescriptions provided to patient and . IV removed. Patient able to state the s/s of blood clots. Extra dressing provided. Volunteer services requested.

## 2017-04-20 NOTE — PROGRESS NOTES
Problem: Mobility Impaired (Adult and Pediatric)  Goal: *Acute Goals and Plan of Care (Insert Text)  Physical Therapy Goals  Initiated 4/19/2017    1. Patient will move from supine to sit and sit to supine , scoot up and down and roll side to side in bed with modified independence within 4 days. 2. Patient will perform sit to stand with modified independence within 4 days. 3. Patient will ambulate with modified independence for 250 feet with the least restrictive device within 4 days. 4. Patient will ascend/descend 3 stairs with bilateral handrail(s) with modified independence within 4 days. 5. Patient will perform home exercise program per protocol with independence within 4 days. 6. Patient will demonstrate AROM 0-90 degrees in operative joint within 4 days. PHYSICAL THERAPY TREATMENT  Patient: Saul Schaefer (65 y.o. female)  Date: 4/20/2017  Diagnosis: DJD  Arthritis of knee, left  Primary localized osteoarthritis of left knee <principal problem not specified>  Procedure(s) (LRB):  LEFT TOTAL KNEE REPLACEMENT (Left) 1 Day Post-Op  Precautions: WBAT      ASSESSMENT:  Patient received supine in bed and eager to participate in therapy. Patient tolerated session well and made good progress towards goals. Patient completed supine<>sit independently, sit<>stand from bed, toilet and wheelchair with standby assist and RW. Patient ambulated with RW with standby assist and completed stair training with standby assist and good technique. No LOB noted for gait and stair training. Patient performed LE therex well. Provided education on ice schedule. Patient is cleared from a PT standpoint to be discharged home with HHPT and RW. Patient does not need an afternoon PT session; notified RN.   Progression toward goals:  [X]      Improving appropriately and progressing toward goals  [ ]      Improving slowly and progressing toward goals  [ ]      Not making progress toward goals and plan of care will be adjusted PLAN:  Patient continues to benefit from skilled intervention to address the above impairments. Continue treatment per established plan of care. Discharge Recommendations:  Home Health  Further Equipment Recommendations for Discharge:  rolling walker       SUBJECTIVE:   Patient stated I am ready to get out of here. The doctor said I could if I do well.       OBJECTIVE DATA SUMMARY:   Critical Behavior:  Neurologic State: Alert  Orientation Level: Oriented X4  Cognition: Follows commands        Functional Mobility Training:  Bed Mobility:     Supine to Sit: Independent  Sit to Supine: Independent           Transfers:  Sit to Stand: Stand-by asssistance  Stand to Sit: Stand-by asssistance        Balance:  Sitting: Intact  Standing: Intact; With support  Ambulation/Gait Training:  Distance (ft): 120 Feet (ft)  Assistive Device: Gait belt;Walker, rolling  Ambulation - Level of Assistance: Contact guard assistance;Stand-by asssistance        Gait Abnormalities: Step to gait (progressed to step through)     Left Side Weight Bearing: As tolerated  Base of Support: Widened     Speed/Betina: Pace decreased (<100 feet/min)        Stairs:  Number of Stairs Trained: 4  Stairs - Level of Assistance: Stand-by asssistance  Rail Use: Both  Therapeutic Exercises:     EXERCISE   Sets   Reps   Active Active Assist   Passive Self ROM   Comments   Ankle Pumps   10 [X]                                        [ ]                                        [ ]                                        [ ]                                            Quad Sets   10 [X]                                        [ ]                                        [ ]                                        [ ]                                            Heel Slides   10 [X]                                        [ ]                                        [ ]                                        [ ]                                               Pain:  Pain Scale 1: Numeric (0 - 10)  Pain Intensity 1: 5  Pain Location 1: Knee  Pain Orientation 1: Left  Pain Description 1: Aching  Pain Intervention(s) 1: Medication (see MAR)  Activity Tolerance:   Good. VSS  Please refer to the flowsheet for vital signs taken during this treatment.   After treatment:   [ ] Patient left in no apparent distress sitting up in chair  [X] Patient left in no apparent distress in bed  [X] Call bell left within reach  [X] Nursing notified  [ ] Caregiver present  [ ] Bed alarm activated      COMMUNICATION/COLLABORATION:   The patients plan of care was discussed with: Registered Nurse     Narciso oHward, PT, DPT   Time Calculation: 23 mins

## 2017-04-20 NOTE — DISCHARGE SUMMARY
Ortho Discharge Summary    Patient ID:  Saul Schaefer  [id number]  female  46 y.o.  1965    Admit date: 4/19/2017    Discharge date: 4/20/2017    Admitting Physician: Barbara Hendricks MD     Consulting Physician(s):   Treatment Team: Attending Provider: Barbara Hendricks MD; Utilization Review: Erendira Nuñez    Date of Surgery:   4/19/2017     Preoperative Diagnosis:  DJD    Postoperative Diagnosis:   DJD    Procedure(s):     LEFT TOTAL KNEE REPLACEMENT     Anesthesia Type:   Spinal     Surgeon: Barbara Hendricks MD                            HPI:  Pt is a 46 y.o. female who has a history of DJD  with pain and limitations of activities of daily living who presents at this time for a left TKA following the failure of conservative management. PMH:   Past Medical History:   Diagnosis Date    Arthritis     Deep vein thrombosis (DVT) (HCC)     left leg    Hypertension     Ill-defined condition     high cholesterol    Ill-defined condition     non healing vein ulcer    MI (myocardial infarction) ([facility] [address]     2 stents       Body mass index is 39.63 kg/(m^2). : A BMI >30 is classified as Obesity. Medications upon admission :   Prior to Admission Medications   Prescriptions Last Dose Informant Patient Reported? Taking? HYDROcodone-acetaminophen (NORCO) 5-325 mg per tablet 4/18/2017 at Unknown time  Yes Yes   Sig: Take 1 Tab by mouth every six (6) hours as needed for Pain. aspirin delayed-release 81 mg tablet 4/12/2017 at Unknown time  Yes Yes   Sig: Take 81 mg by mouth daily. atorvastatin (LIPITOR) 40 mg tablet 4/18/2017 at Unknown time  Yes Yes   Sig: Take 40 mg by mouth daily. citalopram (CELEXA) 20 mg tablet 4/19/2017 at 0400  Yes Yes   Sig: Take 20 mg by mouth daily. docusate sodium (STOOL SOFTENER) 100 mg capsule 4/18/2017 at Unknown time  Yes Yes   Sig: Take 100 mg by mouth two (2) times daily as needed for Constipation.    flaxseed oil 1,000 mg cap 4/18/2017 at Unknown time  Yes Yes Sig: Take 1 Tab by mouth daily. furosemide (LASIX) 20 mg tablet 4/18/2017 at Unknown time  Yes Yes   Sig: Take 20 mg by mouth daily. lisinopril-hydrochlorothiazide (PRINZIDE, ZESTORETIC) 20-25 mg per tablet 4/18/2017 at Unknown time  Yes Yes   Sig: Take 1 Tab by mouth daily. meloxicam (MOBIC) 15 mg tablet 4/12/2017 at Unknown time  Yes Yes   Sig: Take 15 mg by mouth daily. metFORMIN (GLUCOPHAGE) 500 mg tablet 4/18/2017 at Unknown time  Yes Yes   Sig: Take 500 mg by mouth daily (with breakfast). metoprolol (LOPRESSOR) 25 mg tablet 4/19/2017 at 0400  Yes Yes   Sig: Take 25 mg by mouth daily. traMADol (ULTRAM) 50 mg tablet 4/5/2017 at Unknown time  No Yes   Sig: Take 1 Tab by mouth every six (6) hours as needed. Max Daily Amount: 200 mg. Facility-Administered Medications: None        Allergies: Allergies   Allergen Reactions    Cortisone Itching    Miralax [Polyethylene Glycol 3350] Itching        Hospital Course: The patient underwent surgery. Complications:  None; patient tolerated the procedure well. Was taken to the PACU in stable condition and then transferred to the ortho floor. Perioperative Antibiotics:  Ancef     Postoperative Pain Management:  Oxycodone      DVT Prophylaxis: Aspirin      Postoperative transfusions:    Number of units banked PRBCs =   none     Post Op complications: none    Hemoglobin at discharge:    Lab Results   Component Value Date/Time    HGB 8.9 (L) 04/20/2017 02:54 AM    INR 1.0 04/11/2017 09:24 AM       Dressing was changed on POD # 1. Incision - clean, dry and intact. No significant erythema or swelling. Neurovascular exam found to be within normal limits. Wound appears to be healing without any evidence of infection. Pt had a HVAC drain that was removed on POD# 1. Physical Therapy started on the day following surgery and participated in bed mobility, transfers and ambulation.         Gait:  Gait  Base of Support: Widened  Speed/Betina: [employer] decreased (<100 feet/min)  Gait Abnormalities: Step to gait (progressed to step through)  Ambulation - Level of Assistance: Contact guard assistance, Stand-by asssistance  Distance (ft): 120 Feet (ft)  Assistive Device: Gait belt, Walker, rolling  Rail Use: Both  Stairs - Level of Assistance: Stand-by asssistance  Number of Stairs Trained: 4                   Discharged to: Home. Condition on Discharge:   stable    Discharge instructions:  - Anticoagulate with Aspirin   - Take pain medications as prescribed  - Resume pre hospital diet      - Discharge activity: activity as tolerated  - Ambulate with Walker;    - Weight bearing status WBAT  - Wound Care Keep wound clean and dry. See discharge instruction sheet.  - Staples to be removed 10 days after surgery            -DISCHARGE MEDICATION LIST     Current Discharge Medication List      START taking these medications    Details   acetaminophen (TYLENOL) 325 mg tablet Take 2 Tabs by mouth every six (6) hours for 14 days. Qty: 112 Tab, Refills: 0      famotidine (PEPCID) 20 mg tablet Take 1 Tab by mouth two (2) times a day for 30 days. Qty: 60 Tab, Refills: 0      oxyCODONE IR (ROXICODONE) 5 mg immediate release tablet Take 1-2 Tabs by mouth every three (3) hours as needed. Max Daily Amount: 80 mg.  Qty: 80 Tab, Refills: 0      polyethylene glycol (MIRALAX) 17 gram packet Take 1 Packet by mouth daily as needed (constipation) for up to 15 days. Qty: 15 Packet, Refills: 0      senna-docusate (PERICOLACE) 8.6-50 mg per tablet Take 1 Tab by mouth daily. Qty: 30 Tab, Refills: 0         CONTINUE these medications which have CHANGED    Details   aspirin delayed-release 325 mg tablet Take 1 Tab by mouth two (2) times a day for 30 days. Qty: 60 Tab, Refills: 0         CONTINUE these medications which have NOT CHANGED    Details   flaxseed oil 1,000 mg cap Take 1 Tab by mouth daily. metFORMIN (GLUCOPHAGE) 500 mg tablet Take 500 mg by mouth daily (with breakfast). docusate sodium (STOOL SOFTENER) 100 mg capsule Take 100 mg by mouth two (2) times daily as needed for Constipation. atorvastatin (LIPITOR) 40 mg tablet Take 40 mg by mouth daily. meloxicam (MOBIC) 15 mg tablet Take 15 mg by mouth daily. furosemide (LASIX) 20 mg tablet Take 20 mg by mouth daily. citalopram (CELEXA) 20 mg tablet Take 20 mg by mouth daily. lisinopril-hydrochlorothiazide (PRINZIDE, ZESTORETIC) 20-25 mg per tablet Take 1 Tab by mouth daily. metoprolol (LOPRESSOR) 25 mg tablet Take 25 mg by mouth daily. STOP taking these medications       HYDROcodone-acetaminophen (NORCO) 5-325 mg per tablet Comments:   Reason for Stopping:         traMADol (ULTRAM) 50 mg tablet Comments:   Reason for Stopping:            per medical continuation form      -Follow up in office in 2 weeks      Signed:  Maude Charles.  Freddie Farias, KELLY, ACNP, ONP-C  Orthopaedic Nurse Practitioner    4/20/2017  10:37 AM

## 2017-04-20 NOTE — PROGRESS NOTES
PT cleared patient for discharge, informed Edgar Lynch NP. Informed [facility].

## 2017-04-20 NOTE — PROGRESS NOTES
Ortho / Neurosurgery NP Note    POD# 1  s/p LEFT TOTAL KNEE REPLACEMENT   Pt seen with Dr. Jordan Banda    Pt resting in chair - just completed breakfast and tolerating PO well  No complaints. Aware to call and ask for PRN pain medications when needed    VSS Afebrile. Voiding status: + void    Labs  Lab Results   Component Value Date/Time    HGB 8.9 04/20/2017 02:54 AM      Lab Results   Component Value Date/Time    INR 1.0 04/11/2017 09:24 AM        Body mass index is 39.63 kg/(m^2). : A BMI >30 is classified as Obesity. Dressing c.d.i  Cryotherapy in place over incision  Drain removed this mornign  Calves soft and supple; No pain with passive stretch  Sensation and motor intact  SCDs for mechanical DVT proph while in bed     PLAN:  1) PT BID  2) Aspirin 325 mg PO BID for DVT Prophylaxis    3) Plan d/c home today if progressing well vs tomorrow.     Beatriz Rojo NP

## 2017-04-20 NOTE — PROGRESS NOTES
Bedside and Verbal shift change report given to [address] (oncoming nurse) by Gonzalo Biswas RN (offgoing nurse). Report included the following information SBAR, Kardex, OR Summary, Procedure Summary, Intake/Output and MAR.

## 2019-06-18 ENCOUNTER — HOSPITAL ENCOUNTER (OUTPATIENT)
Dept: WOUND CARE | Age: 54
Discharge: HOME | End: 2019-06-18
Payer: COMMERCIAL

## 2019-06-18 VITALS
HEART RATE: 51 BPM | TEMPERATURE: 98.2 F | SYSTOLIC BLOOD PRESSURE: 128 MMHG | DIASTOLIC BLOOD PRESSURE: 76 MMHG | RESPIRATION RATE: 16 BRPM

## 2019-06-18 PROCEDURE — 74011000250 HC RX REV CODE- 250: Performed by: PLASTIC SURGERY

## 2019-06-18 PROCEDURE — 97597 DBRDMT OPN WND 1ST 20 CM/<: CPT

## 2019-06-18 RX ORDER — CELECOXIB 200 MG/1
200 CAPSULE ORAL 2 TIMES DAILY
COMMUNITY

## 2019-06-18 RX ADMIN — Medication: at 08:36

## 2019-06-18 NOTE — WOUND CARE
06/18/19 0830 Wound Leg lower Left;Medial;Distal  
Date First Assessed/Time First Assessed: 06/18/19 0827   Wound Approximate Age at First Assessment (Weeks): 2 weeks  Primary Wound Type: Venous Ulcer  Location: Leg lower  Wound Location Orientation: Left;Medial;Distal  
Dressing Status Removed Dressing Type Foam;Special tape (comment) Wound Length (cm) 1.5 cm Wound Width (cm) 0.9 cm Wound Depth (cm) 0.1 cm Wound Volume (cm^3) 0.14 cm^3 Condition of Base Pink Condition of Edges Open Drainage Amount Moderate Drainage Color Serosanguinous Wound Odor None Cleansing and Cleansing Agents  Normal saline Wound Leg lower Left;Medial;Proximal  
Date First Assessed/Time First Assessed: 06/18/19 0829   Present on Hospital Admission: Yes  Wound Approximate Age at First Assessment (Weeks): 2 weeks  Primary Wound Type: Venous Ulcer  Location: Leg lower  Wound Location Orientation: Left;Medial;Pro. .. Dressing Status Removed Dressing Type Foam  
Wound Length (cm) 0.9 cm Wound Width (cm) 0.7 cm Wound Depth (cm) 0.1 cm Wound Volume (cm^3) 0.06 cm^3 Condition of Base Pink Condition of Edges Open Drainage Amount Moderate Drainage Color Serosanguinous Wound Odor None Cleansing and Cleansing Agents  Normal saline Visit Vitals /76 Pulse (!) 51 Temp 98.2 °F (36.8 °C) Resp 16 Breastfeeding? No  
 
LLE Peripheral Vascular Capillary Refill: Less than/equal to 3 seconds (06/18/19 0825) Color: Appropriate for race;Pink (06/18/19 0825) Temperature: Warm (06/18/19 0825) Sensation: Present (06/18/19 0825) Post-tibial Pulse: Palpable (06/18/19 0825) Pedal Pulse: Palpable (06/18/19 0825) Circumference of Calf (cm): 42.5 cm (06/18/19 0825) Location of Measurement (Calf): Mid  (06/18/19 0825) Circumference of Ankle (cm): 23 cm (06/18/19 0825) Location of Measurement (Ankle): Upper  (06/18/19 0825)

## 2019-06-18 NOTE — WOUND CARE
06/18/19 2831 Wound Leg lower Left;Medial;Distal  
Date First Assessed/Time First Assessed: 06/18/19 0827   Wound Approximate Age at First Assessment (Weeks): 2 weeks  Primary Wound Type: Venous Ulcer  Location: Leg lower  Wound Location Orientation: Left;Medial;Distal  
Cleansing and Cleansing Agents  Normal saline Dressing Type Applied Foam;Compression Wrap/Venous Stasis (Polymem, 3 layer compression wrap.) Wound Procedure Type Selective Debridement Procedure Time Out 9606 Consent Obtained  Yes Procedure Bleeding Minimal  
Procedure Hemostasis  Pressure Procedure Instrument  Curette Procedure Pain Scale Numeric 2/10 Debridement Procedure Performed by Dr. Maya Perdue Procedure Tolerated Well Wound Leg lower Left;Medial;Proximal  
Date First Assessed/Time First Assessed: 06/18/19 0829   Present on Hospital Admission: Yes  Wound Approximate Age at First Assessment (Weeks): 2 weeks  Primary Wound Type: Venous Ulcer  Location: Leg lower  Wound Location Orientation: Left;Medial;Pro. .. Cleansing and Cleansing Agents  Normal saline Dressing Type Applied Foam 
(polymem, A&D to intact skin) Wound Procedure Type Selective Debridement Procedure Time Out 6264 Consent Obtained  Yes Procedure Bleeding Minimal  
Procedure Hemostasis  Pressure Type of Tissue Removed  Devitalized Procedure Instrument  Curette Procedure Pain Scale Numeric 2/10 Debridement Procedure Performed by Dr. Maya Perdue Post-Procedure Length (cm) 0.9 cm Post-Procedure Width (cm) 0.7 cm Post-Procedure Depth (cm) 0.1 cm Post-Procedure Volume (cm^3) 0.06 cm^3 Post-Procedure Surface Area (cm^2) 0.63 cm^2 Post Procedure Pain Scale Numeric 0/10 Procedure Tolerated Well Patient was discharged with Self to home and was ambulatory. In stable condition with c/o pain:_0_/10_

## 2019-06-27 ENCOUNTER — HOSPITAL ENCOUNTER (OUTPATIENT)
Dept: WOUND CARE | Age: 54
Discharge: HOME | End: 2019-06-27
Payer: COMMERCIAL

## 2019-06-27 VITALS
SYSTOLIC BLOOD PRESSURE: 132 MMHG | TEMPERATURE: 98.2 F | HEART RATE: 57 BPM | DIASTOLIC BLOOD PRESSURE: 69 MMHG | RESPIRATION RATE: 16 BRPM

## 2019-06-27 PROCEDURE — 11042 DBRDMT SUBQ TIS 1ST 20SQCM/<: CPT

## 2019-06-27 PROCEDURE — 74011000250 HC RX REV CODE- 250: Performed by: SURGERY

## 2019-06-27 RX ADMIN — Medication: at 07:00

## 2019-06-27 NOTE — WOUND CARE
06/27/19 8519 Wound Leg lower Left;Medial;Distal  
Date First Assessed/Time First Assessed: 06/18/19 0827   Wound Approximate Age at First Assessment (Weeks): 2 weeks  Primary Wound Type: Venous Ulcer  Location: Leg lower  Wound Location Orientation: Left;Medial;Distal  
Dressing Type Applied (Polymem, unna boot) Wound Leg lower Left;Medial;Proximal  
Date First Assessed/Time First Assessed: 06/18/19 0829   Present on Hospital Admission: Yes  Wound Approximate Age at First Assessment (Weeks): 2 weeks  Primary Wound Type: Venous Ulcer  Location: Leg lower  Wound Location Orientation: Left;Medial;Pro. .. Dressing Type Applied (Polymem, unna boot) Patient discharged to home ambulatory, denied pain at time of discharge. She will return to clinic on 07/03 for nurse visit/dressing change and on 07/11/19 for MD follow-up

## 2019-06-27 NOTE — PROGRESS NOTES
[address]  WOUND CARE PROGRESS NOTE    Name:  Abraham Parks  MR#:  [id number]  :  1965  ACCOUNT #:  [de-identified]  DATE OF SERVICE:  2019    SUBJECTIVE:  The patient is a 68-year-old woman who was last seen at the [address] [place] [address] Road by Dr Dirk Cruz on 2019. She had presented in with a venous stasis ulcer, left lower extremity with associated cellulitis. She was started on Bactrim p.o. 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 The patient had PolyMem applied over the wound and then a three-layer compression wrap. The patient reports that her leg feels somewhat better. 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 She also reports that she had been, in the past, treated for venous ulceration by Dmitri kaur with Dr. Roselia Quarles and prefers use of the [address]. The patient also reports that she has had venous intervention in the past by Dr. Roselia Quarles. The patient reports borderline diabetes which is managed with oral metformin. OBJECTIVE:  On examination, the patient has 2+ left dorsalis pedis pulse. There is trace edema in the lower leg. There are 2 adjacent ulcers distal medial aspect of the left lower leg with the medial distal wound being 1.5 x 0.9 x 0.1 cm in size and the medial proximal wound being 0.9 x 0.7 x 0.1 cm in dimension. There was slough and granulation on the wound surface which was pale. I recommended debridement of the wounds. After application of topical lidocaine gel, a 5-mm ring curette was utilized to perform a sharp excisional debridement of each of the wounds. Slough and granulation was excised down into the subcutaneous layer. Wound dimension of the medial distal wound on the left following debridement was 1.5 x 0.9 x 0.2 cm. Dimensions of the medial proximal wound, left leg, was 0.9 x 0.7 x 0.2 cm. Silver nitrate was utilized to obtain hemostasis together with compression.     PolyMem was placed on the ulcers. Foam dressings applied to the anterior ankle and proximal dorsal foot for padding. Unna boot was then applied on the left. The patient will follow up for a nurse's visit in 1 week and will see me in the [address] [place] [address] in 2 weeks. FINAL DIAGNOSIS:  Venous stasis ulcers, left lower leg.       Pattie Ugalde MD      [id number]  D:  06/27/2019 9:09  T:  06/27/2019 11:54  JOB #:  [id number]

## 2019-06-27 NOTE — WOUND CARE
06/27/19 0840 Wound Leg lower Left;Medial;Distal  
Date First Assessed/Time First Assessed: 06/18/19 0827   Wound Approximate Age at First Assessment (Weeks): 2 weeks  Primary Wound Type: Venous Ulcer  Location: Leg lower  Wound Location Orientation: Left;Medial;Distal  
Dressing Status Removed Dressing Type  
(foam, 3 layer wrap) Wound Length (cm) 1.1 cm Wound Width (cm) 0.8 cm Wound Depth (cm) 0.2 cm Wound Volume (cm^3) 0.18 cm^3 Condition of Base Pink;Slough Condition of Edges Open Drainage Amount Small Drainage Color Serosanguinous Wound Odor None Sidra-wound Assessment Intact Wound Leg lower Left;Medial;Proximal  
Date First Assessed/Time First Assessed: 06/18/19 0829   Present on Hospital Admission: Yes  Wound Approximate Age at First Assessment (Weeks): 2 weeks  Primary Wound Type: Venous Ulcer  Location: Leg lower  Wound Location Orientation: Left;Medial;Pro. .. Dressing Status Removed Dressing Type (Foam, 3 layer wrap) Wound Length (cm) 0.7 cm Wound Width (cm) 0.5 cm Wound Depth (cm) 0.2 cm Wound Volume (cm^3) 0.07 cm^3 Condition of Base Pink;Slough Condition of Edges Open Drainage Amount Small Drainage Color Serosanguinous Wound Odor None Cleansing and Cleansing Agents  Normal saline; Soap and water Visit Vitals /69 Pulse (!) 57 Temp 98.2 °F (36.8 °C) Resp 16

## 2019-07-03 ENCOUNTER — HOSPITAL ENCOUNTER (OUTPATIENT)
Dept: WOUND CARE | Age: 54
Discharge: HOME | End: 2019-07-03
Payer: COMMERCIAL

## 2019-07-03 VITALS
RESPIRATION RATE: 16 BRPM | TEMPERATURE: 97.3 F | HEART RATE: 51 BPM | SYSTOLIC BLOOD PRESSURE: 133 MMHG | DIASTOLIC BLOOD PRESSURE: 67 MMHG

## 2019-07-03 PROCEDURE — 29580 STRAPPING UNNA BOOT: CPT

## 2019-07-03 NOTE — WOUND CARE
07/03/19 1130 Wound Leg lower Left;Medial;Distal  
Date First Assessed/Time First Assessed: 06/18/19 0827   Wound Approximate Age at First Assessment (Weeks): 2 weeks  Primary Wound Type: Venous Ulcer  Location: Leg lower  Wound Location Orientation: Left;Medial;Distal  
Dressing Status Removed Dressing Type (Polymem, unna boot) Condition of Base Pink Condition of Edges Open Drainage Amount Small Drainage Color Serosanguinous Wound Odor None Sidra-wound Assessment Intact Cleansing and Cleansing Agents  Normal saline; Soap and water Procedure Tolerated Well Patient discharged to home ambulatory, denied pain at time of discharge, She will return to clinic for MD follow-up week of 07/08/15.

## 2019-07-11 ENCOUNTER — HOSPITAL ENCOUNTER (OUTPATIENT)
Dept: WOUND CARE | Age: 54
Discharge: HOME | End: 2019-07-11
Payer: COMMERCIAL

## 2019-07-11 VITALS
SYSTOLIC BLOOD PRESSURE: 94 MMHG | TEMPERATURE: 98.1 F | DIASTOLIC BLOOD PRESSURE: 54 MMHG | HEART RATE: 52 BPM | RESPIRATION RATE: 16 BRPM

## 2019-07-11 PROBLEM — L97.922 NON-PRESSURE CHRONIC ULCER OF LEFT LOWER LEG WITH FAT LAYER EXPOSED (HCC): Status: ACTIVE | Noted: 2019-07-11

## 2019-07-11 PROCEDURE — 11042 DBRDMT SUBQ TIS 1ST 20SQCM/<: CPT

## 2019-07-11 PROCEDURE — 74011000250 HC RX REV CODE- 250: Performed by: SURGERY

## 2019-07-11 RX ADMIN — Medication: at 08:53

## 2019-07-11 NOTE — WOUND CARE
07/11/19 8120 Wound Leg lower Left;Medial;Distal  
Date First Assessed/Time First Assessed: 06/18/19 0827   Wound Approximate Age at First Assessment (Weeks): 2 weeks  Primary Wound Type: Venous Ulcer  Location: Leg lower  Wound Location Orientation: Left;Medial;Distal  
Dressing Type Applied Foam;Unna boot (Polymem, foam on dorsal ankle for protection) Wound Procedure Type Debridement- Surgical  
Procedure Time Out 7632 Consent Obtained  Yes Procedure Bleeding Minimal  
Procedure Hemostasis  Pressure Procedure Instrument  Curette Procedure Pain Scale Numeric 0/10 Debridement Procedure Performed by Dr Eric Shen Post-Procedure Length (cm) 0.9 cm Post-Procedure Width (cm) 0.5 cm Post-Procedure Depth (cm) 0.2 cm Post-Procedure Volume (cm^3) 0.09 cm^3 Post-Procedure Surface Area (cm^2) 0.45 cm^2 Post Procedure Pain Scale Numeric 0/10 Procedure Tolerated Well Wound Leg lower Left;Medial;Proximal  
Date First Assessed/Time First Assessed: 06/18/19 0829   Present on Hospital Admission: Yes  Wound Approximate Age at First Assessment (Weeks): 2 weeks  Primary Wound Type: Venous Ulcer  Location: Leg lower  Wound Location Orientation: Left;Medial;Pro. .. Dressing Type Applied  
(poly mem) Patient was discharged with Self to home and was ambulatory. In stable condition with c/o pain:_0_/10_

## 2019-07-11 NOTE — PROGRESS NOTES
SUBJECTIVE:  The patient is a 66-year-old woman who was  seen at the [address] [place] [address] by Dr Forrest Davila on 06/18/2019. She had presented in with a venous stasis ulcer, left lower extremity with associated cellulitis. She was started on Bactrim p.o. 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    The patient had PolyMem applied over the wound and Unna boot at last visit.     The patient reports that her leg feels somewhat better. 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 She also reports that she had been, in the past, treated for venous ulceration by Holly kaur with Dr. Castro Santoro and prefers use of the [employer].     The patient also reports that she has had venous intervention in the past by Dr. Castro Santoro.     The patient reports borderline diabetes which is managed with oral metformin.     OBJECTIVE:  On examination, the patient has 2+ left dorsalis pedis pulse. There is trace edema in the lower leg. There are 2 adjacent ulcers distal medial aspect of the left lower leg with the medial distal wound being 0.9 x 0.5 x 0.1 cm in size and the medial proximal wound being 0.7 x 0.5 x 0.2 cm in dimension.     There was slough and granulation on the wound surface which was pale.     I recommended debridement of the wound. After application of topical lidocaine gel, a 5-mm ring curette was utilized to perform a sharp excisional debridement of the medial distal wound. Slough and granulation was excised down into the subcutaneous layer. After debridement, dimensions of the medial distal wound, left leg, was 0.9 x 0.5 x 0.2 cm.     PolyMem was placed on the ulcers. Foam dressings applied to the anterior ankle and proximal dorsal foot for padding. Unna boot was then applied on the left.     The patient will follow up for a nurse's visit in 1 week and will see me in the [address] [place] [address] in 2 weeks.     FINAL DIAGNOSIS:  Venous stasis ulcers, left lower leg.     K57.926, I83.009        Magaly Dennison Daria Aaron MD

## 2019-07-11 NOTE — WOUND CARE
07/11/19 4428 Wound Leg lower Left;Medial;Distal  
Date First Assessed/Time First Assessed: 06/18/19 0827   Wound Approximate Age at First Assessment (Weeks): 2 weeks  Primary Wound Type: Venous Ulcer  Location: Leg lower  Wound Location Orientation: Left;Medial;Distal  
Dressing Status Removed Dressing Type Foam;Unna boot Wound Length (cm) 0.9 cm Wound Width (cm) 0.5 cm Wound Depth (cm) 0.1 cm Wound Volume (cm^3) 0.04 cm^3 Condition of [facility] Condition of Edges Open Drainage Amount Moderate Drainage Color Serosanguinous Wound Odor None Sidra-wound Assessment Intact Cleansing and Cleansing Agents  Soap and water;Normal saline Visit Vitals BP 94/54 Pulse (!) 52 Temp 98.1 °F (36.7 °C) Resp 16 LLE Peripheral Vascular Capillary Refill: Less than/equal to 3 seconds (07/11/19 0851) Color: Appropriate for race (07/11/19 0851) Temperature: Warm (07/11/19 0851) Sensation: Present (07/11/19 0851) Pedal Pulse: Palpable (07/11/19 0851) Circumference of Calf (cm): 40 cm (07/11/19 0851) Location of Measurement (Calf): Mid  (07/11/19 0851) Circumference of Ankle (cm): 23 cm (07/11/19 0851) Location of Measurement (Ankle): Upper  (07/11/19 0851)

## 2019-07-18 ENCOUNTER — HOSPITAL ENCOUNTER (OUTPATIENT)
Dept: WOUND CARE | Age: 54
Discharge: HOME | End: 2019-07-18
Payer: COMMERCIAL

## 2019-07-18 VITALS
SYSTOLIC BLOOD PRESSURE: 120 MMHG | DIASTOLIC BLOOD PRESSURE: 62 MMHG | RESPIRATION RATE: 16 BRPM | HEART RATE: 51 BPM | TEMPERATURE: 98.7 F

## 2019-07-18 PROCEDURE — 29580 STRAPPING UNNA BOOT: CPT

## 2019-07-18 PROCEDURE — 74011000250 HC RX REV CODE- 250: Performed by: SURGERY

## 2019-07-18 RX ADMIN — Medication: at 08:22

## 2019-07-18 NOTE — WOUND CARE
07/18/19 0811   Wound Leg lower Left;Medial;Distal   Date First Assessed/Time First Assessed: 06/18/19 0827   Wound Approximate Age at First Assessment (Weeks): 2 weeks  Primary Wound Type: Venous Ulcer  Location: Leg lower  Wound Location Orientation: Left;Medial;Distal   Dressing Status Removed   Dressing Type   (Polymem, unna boot)   Wound Length (cm) 1 cm   Wound Width (cm) 1 cm   Wound Depth (cm) 0.1 cm   Wound Volume (cm^3) 0.1 cm^3   Condition of Base Pink;Slough   Condition of Edges Open   Drainage Amount Small   Drainage Color Serosanguinous   Wound Odor None   Sidra-wound Assessment Blanchable erythema   Cleansing and Cleansing Agents  Normal saline; Soap and water     Visit Vitals  /62 (BP 1 Location: Right arm)   Pulse (!) 51   Temp 98.7 °F (37.1 °C)   Resp 16

## 2019-07-18 NOTE — WOUND CARE
07/18/19 0851   Wound Leg lower Left;Medial;Distal   Date First Assessed/Time First Assessed: 06/18/19 0827   Wound Approximate Age at First Assessment (Weeks): 2 weeks  Primary Wound Type: Venous Ulcer  Location: Leg lower  Wound Location Orientation: Left;Medial;Distal   Dressing Type Applied   (Aquacel AG, gauze, unna boot)   Patient discharged to home ambulatory, denied pain at time of discharge. Patient was given prescription for Doxycycline 100mg to take 1 PO BID x 10 days. She is also to schedule appt with [facility] for venous doppler studies. She will return for MD follow-up in 1 week.

## 2019-07-18 NOTE — PROGRESS NOTES
SUBJECTIVE:  The patient is a 68-year-old woman who was  seen at the [address] [place] [address] by Dr Olvin Shen 06/18/2019. Shahram Walton had presented in with a venous stasis ulcer, left lower extremity with associated cellulitis.  She was treated with Bactrim. (she did have history of MRSA in the past).    The patient had PolyMem applied over the wound and Unna boot at last visit.     The patient reports that her leg has increased pain at the ulocer site. 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  She also reports that she had been, in the past, treated for venous ulceration by [facility] [employer] [facility] [employer] with Dr. Imer Osborn and prefers use of the [employer]     The patient also reports that she has had venous intervention in the past by Dr. Imer Osborn.     The patient reports borderline diabetes which is managed with oral metformin.     OBJECTIVE:  On examination, the patient has 2+ left dorsalis pedis pulse.  There is trace edema in the lower leg.  There ia an ulcer medial distal leftleg, dimensions 1 x 1 x 0.1 cm.       There was mild slough and granulation on the wound surface which was pale. This size is increased. There was some tenderness of th ewound. Culture taken of th ewound. No debridement performed.     Aquacell AG was placed on the ulcers.  Foam dressings applied to the anterior ankle and proximal dorsal foot for padding.  Unna boot was then applied on the left. Rx for Doxycycline 100 mg po bid for 10 days.     Venous duplex scan ordered left leg at [facility] to assess current venous reflux status.     The patient will see me in the [address] [place] [address] in 1 week.     FINAL DIAGNOSIS:  Venous stasis ulcers, left lower leg.     L97.922, I83.009        Abdoul Orta MD

## 2019-07-21 LAB — BACTERIA SPEC AEROBE CULT: ABNORMAL

## 2019-07-25 ENCOUNTER — HOSPITAL ENCOUNTER (OUTPATIENT)
Dept: WOUND CARE | Age: 54
Discharge: HOME | End: 2019-07-25
Payer: COMMERCIAL

## 2019-07-25 VITALS
SYSTOLIC BLOOD PRESSURE: 117 MMHG | TEMPERATURE: 98 F | RESPIRATION RATE: 16 BRPM | DIASTOLIC BLOOD PRESSURE: 69 MMHG | HEART RATE: 52 BPM

## 2019-07-25 PROCEDURE — 74011000250 HC RX REV CODE- 250: Performed by: SURGERY

## 2019-07-25 PROCEDURE — 11042 DBRDMT SUBQ TIS 1ST 20SQCM/<: CPT

## 2019-07-25 RX ORDER — DOXYCYCLINE 100 MG/1
100 CAPSULE ORAL 2 TIMES DAILY
COMMUNITY
End: 2019-08-08

## 2019-07-25 RX ADMIN — Medication: at 08:28

## 2019-07-25 NOTE — WOUND CARE
07/25/19 0840   Wound Leg lower Left;Medial;Distal   Date First Assessed/Time First Assessed: 06/18/19 0827   Wound Approximate Age at First Assessment (Weeks): 2 weeks  Primary Wound Type: Venous Ulcer  Location: Leg lower  Wound Location Orientation: Left;Medial;Distal   Dressing Type Applied   (Aquacel AG, gauze, unna boot)   Patient discharged to home ambulatory, denied pain at time of discharge. She will return to clinic in one week for MD follow-up.

## 2019-07-25 NOTE — WOUND CARE
07/25/19 0824   Wound Leg lower Left;Medial;Distal   Date First Assessed/Time First Assessed: 06/18/19 0827   Wound Approximate Age at First Assessment (Weeks): 2 weeks  Primary Wound Type: Venous Ulcer  Location: Leg lower  Wound Location Orientation: Left;Medial;Distal   Dressing Status Removed   Dressing Type   (Aquacel AG, gauze, Unna boot)   Wound Length (cm) 1 cm   Wound Width (cm) 0.8 cm   Wound Depth (cm) 0.1 cm   Wound Volume (cm^3) 0.08 cm^3   Condition of Base Pink;Slough   Condition of Edges Open   Drainage Amount Small   Drainage Color Serosanguinous   Wound Odor None   Sidra-wound Assessment Intact   Cleansing and Cleansing Agents  Normal saline; Soap and water     Visit Vitals  /69 (BP 1 Location: Right arm)   Pulse (!) 52   Temp 98 °F (36.7 °C)   Resp 16

## 2019-07-25 NOTE — PROGRESS NOTES
SUBJECTIVE:  The patient is a 52-year-old woman who was  seen at the [address] [place] [address] by Dr Dilip Palmer 06/18/2019. Gauri Reza had presented in with a venous stasis ulcer, left lower extremity with associated cellulitis.  She was treated with Bactrim. (she did have history of MRSA in the past).    The patient had at last visit  [address] [facility], foam over ankle anteriorly, Unna boot. She is taking doxycycline. Culture 7/18/2019 grew MSSA.     The patient reports that her leg has greatly decreased pain at the ulcer site. 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  She also reports that she had been, in the past, treated for venous ulceration by Sunita kaur with Dr. Destiny Bhandari and prefers use of the [employer].     The patient also reports that she has had venous intervention in the past by Dr. Destiny Bhandari. She is scheduled for venous US 8/1/2019.     The patient reports borderline diabetes which is managed with oral metformin.     OBJECTIVE:  On examination, the patient has 2+ left dorsalis pedis pulse.  There is trace edema in the lower leg.  There ia an ulcer medial distal leftleg, dimensions 1 x 0.8 x 0.1 cm.       There was mild slough and granulation on the wound surface which was pale. Tenderness decreased.       After application of topical lidocaine gel, sharp excisional debridement of the wound was carried out using  5 mm ring curette. Slough and granulation tissue was excised down into the subcutaneous layer. Hemostasis was obtained by compression.   After debridement, the wound dimensions were 1 x 0.8 x 0.2 cm.       Aquacell AG was placed on the ulcers.  Foam dressings applied to the anterior ankle and proximal dorsal foot for padding.  Unna boot was then applied on the left.     The patient will see me in the [address] [place] [address] in 1 week.     FINAL DIAGNOSIS:  Venous stasis ulcers, left lower leg.     L97.922, I83.009        Jose Armando Mcmanus MD

## 2019-08-01 ENCOUNTER — HOSPITAL ENCOUNTER (OUTPATIENT)
Dept: WOUND CARE | Age: 54
Discharge: HOME | End: 2019-08-01
Payer: COMMERCIAL

## 2019-08-01 VITALS
SYSTOLIC BLOOD PRESSURE: 117 MMHG | HEART RATE: 55 BPM | RESPIRATION RATE: 16 BRPM | TEMPERATURE: 97.3 F | DIASTOLIC BLOOD PRESSURE: 60 MMHG

## 2019-08-01 PROCEDURE — 29580 STRAPPING UNNA BOOT: CPT

## 2019-08-01 NOTE — WOUND CARE
08/01/19 2391 Wound Leg lower Left;Medial;Distal  
Date First Assessed/Time First Assessed: 06/18/19 0827   Wound Approximate Age at First Assessment (Weeks): 2 weeks  Primary Wound Type: Venous Ulcer  Location: Leg lower  Wound Location Orientation: Left;Medial;Distal  
Dressing Type Aquacel;Gauze wrap (rina);Gauze;Special tape (comment) Condition of Base Pink;Slough Condition of Edges Open Drainage Amount Moderate Drainage Color Serosanguinous Wound Odor None Sidra-wound Assessment Intact Cleansing and Cleansing Agents  Normal saline; Soap and water Dressing Type Applied Silver products; Alginate;Foam;Unna boot (Aquacel ag on wound, foam to dorsal foot for protection) Visit Vitals /60 Pulse (!) 55 Temp 97.3 °F (36.3 °C) Resp 16 LLE Peripheral Vascular Capillary Refill: Less than/equal to 3 seconds (08/01/19 0908) Color: Appropriate for race (08/01/19 0908) Temperature: Warm (08/01/19 0908) Sensation: Present (08/01/19 0908) Pedal Pulse: Palpable (08/01/19 0908) Patient in for Nurse visit after procedure to vascular testing. Patient was discharged with Self to home and was ambulatory. In stable condition with c/o pain:_0_/10_

## 2019-08-08 ENCOUNTER — HOSPITAL ENCOUNTER (OUTPATIENT)
Dept: WOUND CARE | Age: 54
Discharge: HOME | End: 2019-08-08
Payer: COMMERCIAL

## 2019-08-08 VITALS
RESPIRATION RATE: 16 BRPM | TEMPERATURE: 97.1 F | DIASTOLIC BLOOD PRESSURE: 59 MMHG | SYSTOLIC BLOOD PRESSURE: 115 MMHG | HEART RATE: 47 BPM

## 2019-08-08 PROCEDURE — 74011000250 HC RX REV CODE- 250: Performed by: SURGERY

## 2019-08-08 PROCEDURE — 29580 STRAPPING UNNA BOOT: CPT

## 2019-08-08 RX ADMIN — Medication: at 08:18

## 2019-08-08 NOTE — WOUND CARE
08/08/19 0830 Wound Leg lower Left;Medial;Distal  
Date First Assessed/Time First Assessed: 06/18/19 0827   Wound Approximate Age at First Assessment (Weeks): 2 weeks  Primary Wound Type: Venous Ulcer  Location: Leg lower  Wound Location Orientation: Left;Medial;Distal  
Dressing Type Applied (Aquacel AG, ABD, unna boot) Patient discharged to home ambulatory, denied pain at time of discharge. She will return to clinic in one week for MD follow-up.

## 2019-08-08 NOTE — PROGRESS NOTES
SUBJECTIVE:  The patient is a 41-year-old woman who was  seen at the [address] [place] [address] by Dr Shefali Muse 06/18/2019. Joaquim Bourgeois had presented in with a venous stasis ulcer, left lower extremity with associated cellulitis.  She was treated with Bactrim. (she did have history of MRSA in the past).    The patient had at last visit  [address] [facility] over ulcer, foam over ankle anteriorly, Unna boot.     She has finished taking doxycycline. Culture 7/18/2019 grew MSSA.     The patient reports that her leg has greatly decreased pain at the ulcer site. 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  She also reports that she had been, in the past, treated for venous ulceration by [employer] with Dr. Asia Porter and prefers use of the [email or web address] [employer]     The patient also reports that she has had venous intervention in the past by Dr. Asia Porter.     She had venous US 8/1/2019. This shows recanalization of the left GSV in the thigh and reflux in the SF junction.     The patient reports borderline diabetes which is managed with oral metformin.     OBJECTIVE:  On examination, the patient has 2+ left dorsalis pedis pulse.  There is trace edema in the lower leg.  There ia an ulcer medial distal leftleg, dimensions 1 x 0.6 x 0.1 cm.       There was improved granulation on the wound surface. Tenderness decreased.      No debridement performed.     Aquacell AG was placed on the ulcers.  Foam dressings applied to the anterior ankle and proximal dorsal foot for padding.  Unna boot was then applied on the left.     Nurse visit in 1 week for dressing change. The patient will see me in the [address] [place] [address] in 1 week. I discussed the US result with the patient.   The patient is to see Dr Asia Porter for evaluation for possible venous intervention.     FINAL DIAGNOSIS:  Venous stasis ulcers, left lower leg.     L97.922, I83.009        Delfin Knapp MD

## 2019-08-08 NOTE — WOUND CARE
08/08/19 0815 Wound Leg lower Left;Medial;Distal  
Date First Assessed/Time First Assessed: 06/18/19 0827   Wound Approximate Age at First Assessment (Weeks): 2 weeks  Primary Wound Type: Venous Ulcer  Location: Leg lower  Wound Location Orientation: Left;Medial;Distal  
Dressing Status Removed Dressing Type (Aquacel AG, gauze, unna boot) Wound Length (cm) 1 cm Wound Width (cm) 0.6 cm Wound Depth (cm) 0.1 cm Wound Volume (cm^3) 0.06 cm^3 Condition of Base Pink;Slough Condition of Edges Open Drainage Amount Small Drainage Color Serosanguinous Wound Odor None Sidra-wound Assessment Intact Cleansing and Cleansing Agents  Normal saline; Soap and water Visit Vitals /59 (BP 1 Location: Left arm) Pulse (!) 47 Temp 97.1 °F (36.2 °C) Resp 16

## 2019-08-14 NOTE — WOUND CARE
Discharge Instructions for Wound Cleansing: Do not scrub or use excessive force. Cleanse wound prior to applying a clean dressing with: 
[x] Normal Saline [] Keep Wound Dry in Shower    [] Wound Cleanser  
[x] Cleanse wound with Mild Soap & Water  [] May Shower at Discharge  
[] Other:   
 
Topical Treatments: Do not apply lotions, creams, or ointments to wound bed unless directed. [] Apply moisturizing lotion to skin surrounding the wound prior to dressing change. [x] Apply antifungal ointment to skin surrounding the wound prior to dressing change. 
[] Apply thin film of moisture barrier ointment to skin immediately around wound. [] Other:   
  
Dressings:           Wound Location ***  
[] Apply Primary Dressing:     
 [] MediHoney Gel [] Alginate with Silver [] Alginate 
 [] Collagen [] Collagen with Silver   [] Santyl with Moisten saline gauze   
 [] Hydrocolloid   [] MediHoney Alginate [] Foam with Silver 
 [] Foam   [] Hydrofera Blue    [] Mepilex Border  
 [] Moisten with Saline [] Hydrogel [] Mepitel   
 [] Bactroban/Mupirocin [] Polysporin  [] Other:   
[] Pack wound loosely with  [] Iodoform   [] Plain Packing  [] Other  
[] Cover and Secure with:   
 [] Gauze [] Fadia [] Kerlix [] Ace Wrap [] Cover Roll Tape [] ABD [] Other:  
 Avoid contact of tape with skin. [] Change dressing: [] Daily    [] Every Other Day [] Three times per week 
 [] Once a week [] Do Not Change Dressing   [] Other: 
  
Edema Control: 
Apply: [] Compression Stocking []Right Leg []Left Leg 
 [] Tubigrip []Right Leg Double Layer []Left Leg Double Layer []Right Leg Single Layer []Left Leg Single Layer 
 [] SpandaGrip []Right Leg  []Left Leg 
    []Low compression 5-10 mm/Hg []Medium compression 10-20 mm/Hg []High compression  20-30 mm/Hg 
 every morning immediately when getting up should be applied to affected leg(s) from mid foot to knee making sure to cover the heel.   Remove every night before going to bed. [] Elevate leg(s) above the level of the heart when sitting. [] Avoid prolonged standing in one place. [] Elevate arm/hand above the level of the heart []RightArm []LeftArm Compression: 
Apply: [] Multilayer Compression Wrap Applied in Clinic []RightLeg []Left Leg 
 [] Multi-layer compression. Do not get leg(s) with wrap wet. If wraps become too tight call the center or completely remove the wrap. [] Elevate leg(s) above the level of the heart when sitting. [] Avoid prolonged standing in one place. Dietary: 
[] Diet as tolerated: [] Calorie Diabetic Diet: [x] No Added Salt: 
[] Increase Protein: [] Other:  
Activity: 
[x] Activity as tolerated:  [] Patient has no activity restrictions     [] Strict Bedrest: [] Remain off Work:     [] May return to full duty work:                                   [] Return to work with restrictions:  
         
Return Appointment: 
[] Wound and dressing supply provider:  
[] ECF or Home Healthcare: 
[] Wound Assessment: [] Physician or NP scheduled for Wound Assessment:  
[x] Return Appointment: With ***  in  *** Week(s) [] Ordered tests:

## 2019-08-15 ENCOUNTER — HOSPITAL ENCOUNTER (OUTPATIENT)
Dept: WOUND CARE | Age: 54
Discharge: HOME | End: 2019-08-15
Payer: COMMERCIAL

## 2019-08-15 VITALS
SYSTOLIC BLOOD PRESSURE: 113 MMHG | TEMPERATURE: 97.2 F | DIASTOLIC BLOOD PRESSURE: 61 MMHG | HEART RATE: 48 BPM | RESPIRATION RATE: 16 BRPM

## 2019-08-15 PROCEDURE — 29580 STRAPPING UNNA BOOT: CPT

## 2019-08-15 NOTE — WOUND CARE
Patient in for NV. 
 
 08/15/19 4871 Wound Leg lower Left;Medial;Distal  
Date First Assessed/Time First Assessed: 06/18/19 0827   Wound Approximate Age at First Assessment (Weeks): 2 weeks  Primary Wound Type: Venous Ulcer  Location: Leg lower  Wound Location Orientation: Left;Medial;Distal  
Dressing Status Removed Dressing Type Aquacel;Foam;Unna boot Condition of Base Granulation Condition of Edges Open Drainage Amount Moderate Drainage Color Serosanguinous Wound Odor None Sidra-wound Assessment Intact Cleansing and Cleansing Agents  Normal saline; Soap and water Dressing Type Applied Alginate;Foam;Gauze;Silver products; Unna boot Visit Vitals /61 Pulse (!) 48 Temp 97.2 °F (36.2 °C) Resp 16 LLE Peripheral Vascular Capillary Refill: Less than/equal to 3 seconds (08/15/19 0075) Color: Appropriate for race (08/15/19 3515) Temperature: Warm (08/15/19 1892) Sensation: Present (08/15/19 4434) Pedal Pulse: Palpable (08/15/19 4867) Patient was discharged with Self to home and was ambulatory. In stable condition with c/o pain:_0_/10_

## 2019-08-22 ENCOUNTER — HOSPITAL ENCOUNTER (OUTPATIENT)
Dept: WOUND CARE | Age: 54
Discharge: HOME | End: 2019-08-22
Payer: COMMERCIAL

## 2019-08-22 VITALS
RESPIRATION RATE: 18 BRPM | TEMPERATURE: 97.1 F | SYSTOLIC BLOOD PRESSURE: 115 MMHG | DIASTOLIC BLOOD PRESSURE: 59 MMHG | HEART RATE: 50 BPM

## 2019-08-22 PROCEDURE — 74011000250 HC RX REV CODE- 250: Performed by: SURGERY

## 2019-08-22 PROCEDURE — 29580 STRAPPING UNNA BOOT: CPT

## 2019-08-22 RX ADMIN — Medication: at 08:23

## 2019-08-22 NOTE — PROGRESS NOTES
SUBJECTIVE:  The patient is a 47-year-old woman who was  seen at the [address] [place] [address] by Dr Paulina Leon 06/18/2019. Vane Walls had presented in with a venous stasis ulcer, left lower extremity with associated cellulitis.  She was treated with Bactrim. (she did have history of MRSA in the past).    The patient had at last visit  Aquacell AG over ulcer, foam over ankle anteriorly, Unna boot.     She has finished taking doxycycline.  Culture 7/18/2019 grew MSSA.     The patient reports that her leg has greatly decreased pain at the ulcer site. 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  She also reports that she had been, in the past, treated for venous ulceration by Kathleen Or vincent with Dr. Peace Teran and prefers use of the [employer].     The patient also reports that she has had venous intervention in the past by Dr. Peace Teran.     She had venous US 8/1/2019. This shows recanalization of the left GSV in the thigh and reflux in the SF junction.     The patient reports borderline diabetes which is managed with oral metformin.     OBJECTIVE:  On examination, the patient has 2+ left dorsalis pedis pulse.  There is trace edema in the lower leg.  There ia an ulcer medial distal leftleg, dimensions 1 x 0.5 x 0.1 cm.       There was improved granulation on the wound surface.  Tenderness decreased.      No debridement performed.     Aquacell AG was placed on the ulcers.  Foam dressings applied to the anterior ankle and proximal dorsal foot for padding.  Unna boot was then applied on the left.     The patient will be out of town for 10 days. It is not ideal, but she will keep Unna boot in place for 2 weeks. If the Unna boot becomes soiled or has a lot of drainage, remove Unna and place Aquacell AG dressing and elastic stocking. The patient will see me in the [address] [place] [address] in 2 weeks.     I discussed the US result with the patient.   The patient is to see Dr Peace Teran for evaluation for possible venous intervention.     FINAL DIAGNOSIS:  Venous stasis ulcers, left lower leg.     L97.922, I83.009        Armand Romero MD

## 2019-08-22 NOTE — WOUND CARE
08/22/19 0835   Wound Leg lower Left;Medial;Distal   Date First Assessed/Time First Assessed: 06/18/19 0827   Wound Approximate Age at First Assessment (Weeks): 2 weeks  Primary Wound Type: Venous Ulcer  Location: Leg lower  Wound Location Orientation: Left;Medial;Distal   Dressing Type Applied   (Aquacel AG, gauze, unna boot)   Patient discharged to home ambulatory, denied pain at time of discharge. She will return to clinic in 2 weeks (she is on vacation week of 08/24 thru 08/31/19).

## 2019-08-22 NOTE — WOUND CARE
Visit Vitals  /59 (BP 1 Location: Left arm)   Pulse (!) 50   Temp 97.1 °F (36.2 °C)   Resp 18     LLE Peripheral Vascular   Capillary Refill: (P) Less than/equal to 3 seconds (08/22/19 0820)  Color: (P) Appropriate for race (08/22/19 0820)  Temperature: (P) Warm (08/22/19 0820)  Sensation: (P) Present (08/22/19 0820)  Pedal Pulse: (P) Palpable (08/22/19 0820)  Circumference of Calf (cm): (P) 39 cm (08/22/19 0820)  Location of Measurement (Calf): (P) Mid  (08/22/19 0820)  Circumference of Ankle (cm): (P) 22 cm (08/22/19 0820)  Location of Measurement (Ankle): (P) Upper  (08/22/19 0820)

## 2019-09-05 ENCOUNTER — HOSPITAL ENCOUNTER (OUTPATIENT)
Dept: WOUND CARE | Age: 54
Discharge: HOME | End: 2019-09-05
Payer: COMMERCIAL

## 2019-09-05 VITALS
DIASTOLIC BLOOD PRESSURE: 68 MMHG | SYSTOLIC BLOOD PRESSURE: 112 MMHG | RESPIRATION RATE: 16 BRPM | TEMPERATURE: 98 F | HEART RATE: 56 BPM

## 2019-09-05 PROCEDURE — 11042 DBRDMT SUBQ TIS 1ST 20SQCM/<: CPT

## 2019-09-05 NOTE — WOUND CARE
09/05/19 0827   Wound Leg lower Left;Medial;Distal   Date First Assessed/Time First Assessed: 06/18/19 0827   Wound Approximate Age at First Assessment (Weeks): 2 weeks  Primary Wound Type: Venous Ulcer  Location: Leg lower  Wound Location Orientation: Left;Medial;Distal   Dressing Status Removed   Dressing Type Unna boot   Wound Length (cm) 0.8 cm   Wound Width (cm) 0.3 cm   Wound Depth (cm) 0.1 cm   Wound Volume (cm^3) 0.02 cm^3   Condition of Base Granulation   Condition of Edges Open   Drainage Amount Small   Drainage Color Serous   Wound Odor None   Cleansing and Cleansing Agents  Normal saline; Soap and water     Visit Vitals  /68 (BP 1 Location: Right arm)   Pulse (!) 56   Temp 98 °F (36.7 °C)   Resp (!) 56

## 2019-09-05 NOTE — WOUND CARE
09/05/19 0845   Wound Leg lower Left;Medial;Distal   Date First Assessed/Time First Assessed: 06/18/19 0827   Wound Approximate Age at First Assessment (Weeks): 2 weeks  Primary Wound Type: Venous Ulcer  Location: Leg lower  Wound Location Orientation: Left;Medial;Distal   Dressing Type Applied Silver products; Alginate; Unna boot   Wound Procedure Type Debridement- Surgical   Procedure Time Out 8215   Consent Obtained  Yes   Procedure Bleeding Minimal   Procedure Hemostasis  Pressure   Procedure Instrument  Curette   Procedure Pain Scale Numeric 0/10   Debridement Procedure Performed by DR Karlyn Closs   Post Procedure Pain Scale Numeric 0/10   Procedure Tolerated Well   Wound Leg lower Left;Medial;Proximal   Date First Assessed/Time First Assessed: 06/18/19 0829   Present on Hospital Admission: Yes  Wound Approximate Age at First Assessment (Weeks): 2 weeks  Primary Wound Type: Venous Ulcer  Location: Leg lower  Wound Location Orientation: Left;Medial;Pro. .. Wound Length (cm) 0 cm   Wound Width (cm) 0 cm   Wound Depth (cm) 0 cm   Wound Volume (cm^3) 0 cm^3   Foam on dorsal area for protection Dressing applied by Adali Perez per order.

## 2019-09-05 NOTE — PROGRESS NOTES
SUBJECTIVE:  The patient is a 77-year-old woman who was  seen at the [address] [place] [address] by Dr Dilip Palmer 06/18/2019. Gauri Reza had presented in with a venous stasis ulcer, left lower extremity with associated cellulitis.  She was treated with Bactrim. (she did have history of MRSA in the past).    The patient had at last visit  Aquacell AG over ulcer, foam over ankle anteriorly, Unna boot.     She has finished taking doxycycline.  Culture 7/18/2019 grew MSSA.     The patient reports that her leg has greatly decreased pain at the ulcer site. 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  She also reports that she had been, in the past, treated [facility] with Dr. Destiny Bhandari and prefers use of the [facility]     The patient also reports that she has had venous intervention in the past by Dr. Donna Chang 8/1/2019.  This shows recanalization of the left GSV in the thigh and reflux in the SF junction.     The patient reports borderline diabetes which is managed with oral metformin.     OBJECTIVE:  On examination, the patient has 2+ left dorsalis pedis pulse.  There is trace edema in the lower leg.  There ia an ulcer medial distal leftleg, dimensions 0.8 x 0.3 x 0.1 cm.       There was improved granulation on the wound surface, mild slough.  Tenderness decreased.       After application of topical lidocaine gel, sharp excisional debridement of the wound was carried out using  5 mm ring curette. Slough and granulation tissue was excised down into the subcutaneous layer. Hemostasis was obtained by compression.   After debridement, the wound dimensions were 0.8 x 0.3 x 0.2 cm.       Aquacell AG was placed on the ulcers.  Foam dressings applied to the anterior ankle and proximal dorsal foot for padding.  Unna boot was then applied on the left.     The patient will see me in the [address] [place] [address] in 1 week.     I again discussed the US result with the patient.  The patient is to see Dr Ange Parks for evaluation for possible venous intervention.     FINAL DIAGNOSIS:  Venous stasis ulcers, left lower leg.     L97.922, I83.009        Hans Sharma MD

## 2019-09-12 ENCOUNTER — HOSPITAL ENCOUNTER (OUTPATIENT)
Dept: WOUND CARE | Age: 54
Discharge: HOME | End: 2019-09-12
Payer: COMMERCIAL

## 2019-09-12 VITALS
TEMPERATURE: 98.4 F | HEART RATE: 50 BPM | RESPIRATION RATE: 16 BRPM | DIASTOLIC BLOOD PRESSURE: 50 MMHG | SYSTOLIC BLOOD PRESSURE: 104 MMHG

## 2019-09-12 PROCEDURE — 29580 STRAPPING UNNA BOOT: CPT

## 2019-09-12 NOTE — WOUND CARE
09/12/19 0822   Wound Leg lower Left;Medial;Distal   Date First Assessed/Time First Assessed: 06/18/19 0827   Wound Approximate Age at First Assessment (Weeks): 2 weeks  Primary Wound Type: Venous Ulcer  Location: Leg lower  Wound Location Orientation: Left;Medial;Distal   Dressing Status   (removed by nurse)   Dressing Type   (Aquacel AG, UNNA boot)   Condition of Base Granulation   Condition of Edges Open   Drainage Amount Small   Drainage Color Serous   Wound Odor None   Sidra-wound Assessment Intact   Cleansing and Cleansing Agents    (NS soap and water)   Dressing Type Applied   (aquacel AG, UNNA boot)   Procedure Tolerated Well   In for nurse visit. .. Dressing changed, no questions about her care.

## 2019-09-19 ENCOUNTER — HOSPITAL ENCOUNTER (OUTPATIENT)
Dept: WOUND CARE | Age: 54
Discharge: HOME | End: 2019-09-19
Payer: COMMERCIAL

## 2019-09-19 VITALS
TEMPERATURE: 97.2 F | DIASTOLIC BLOOD PRESSURE: 61 MMHG | SYSTOLIC BLOOD PRESSURE: 138 MMHG | HEART RATE: 53 BPM | RESPIRATION RATE: 16 BRPM

## 2019-09-19 PROCEDURE — 29580 STRAPPING UNNA BOOT: CPT

## 2019-09-19 NOTE — WOUND CARE
09/19/19 1114 Wound Leg lower Left;Medial;Distal  
Date First Assessed/Time First Assessed: 06/18/19 0827   Wound Approximate Age at First Assessment (Weeks): 2 weeks  Primary Wound Type: Venous Ulcer  Location: Leg lower  Wound Location Orientation: Left;Medial;Distal  
Dressing Status Removed Dressing Type (Aquacel AG, gauze, unna boot) Wound Length (cm) 0.7 cm Wound Width (cm) 0.6 cm Wound Depth (cm) 0.1 cm Wound Volume (cm^3) 0.04 cm^3 Condition of Base Granulation Condition of Edges Open Drainage Amount Small Drainage Color Serosanguinous Wound Odor None Sidra-wound Assessment Blanchable erythema Cleansing and Cleansing Agents  Normal saline; Soap and water There were no vitals taken for this visit.

## 2019-09-19 NOTE — PROGRESS NOTES
SUBJECTIVE:  The patient is a 53-year-old woman who was  seen at the [address] [place] [address] by Dr Wilfrido Fulton 06/18/2019. Erick Kingston had presented in with a venous stasis ulcer, left lower extremity with associated cellulitis.  She was treated with Bactrim. (she did have history of MRSA in the past).    The patient had at last visit  Aquacell AG over ulcer, foam over ankle anteriorly, Unna boot.     She has finished taking doxycycline.  Culture 7/18/2019 grew MSSA.     The patient reports that her leg has greatly decreased pain at the ulcer site. 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  She also reports that she had been, in the past, treated for venous ulceration by [facility] with Dr. Da Au and prefers use of the [facility]     The patient also reports that she has had venous intervention in the past by Dr. Peyman Zayas 8/1/2019.  This shows recanalization of the left GSV in the thigh and reflux in the SF junction.     The patient reports borderline diabetes which is managed with oral metformin.     OBJECTIVE:  On examination, the patient has 2+ left dorsalis pedis pulse.  There is trace edema in the lower leg.  There ia an ulcer medial distal leftleg, dimensions 0.8 x 0.3 x 0.1 cm.       There was scab over part of the wound.  Fair granulation otherwise. Tenderness decreased.      No debridement performed.        Aquacell AG was placed on the ulcers.  Foam dressings applied to the anterior ankle and proximal dorsal foot for padding.  Unna boot was then applied on the left.     The patient will see me in the [address] [place] [address] in 1 week.     The patien thas not yet seen Dr Da Au.   I again discussed the [address] [place] [address] [address] result with the patient.  The patient is to see Dr Da Au for evaluation for possible venous re-intervention.     FINAL DIAGNOSIS:  Venous stasis ulcers, left lower leg.     L97.922, I83.009        Therese Batista MD

## 2019-09-26 ENCOUNTER — HOSPITAL ENCOUNTER (OUTPATIENT)
Dept: WOUND CARE | Age: 54
Discharge: HOME | End: 2019-09-26
Payer: COMMERCIAL

## 2019-09-26 VITALS
SYSTOLIC BLOOD PRESSURE: 112 MMHG | RESPIRATION RATE: 16 BRPM | HEART RATE: 51 BPM | DIASTOLIC BLOOD PRESSURE: 58 MMHG | TEMPERATURE: 96.8 F

## 2019-09-26 PROCEDURE — 29580 STRAPPING UNNA BOOT: CPT

## 2019-10-03 ENCOUNTER — HOSPITAL ENCOUNTER (OUTPATIENT)
Dept: WOUND CARE | Age: 54
Discharge: HOME | End: 2019-10-03
Payer: COMMERCIAL

## 2019-10-03 VITALS
DIASTOLIC BLOOD PRESSURE: 51 MMHG | HEART RATE: 47 BPM | RESPIRATION RATE: 16 BRPM | SYSTOLIC BLOOD PRESSURE: 106 MMHG | TEMPERATURE: 96.9 F

## 2019-10-03 PROCEDURE — 29580 STRAPPING UNNA BOOT: CPT

## 2019-10-03 PROCEDURE — 74011000250 HC RX REV CODE- 250: Performed by: SURGERY

## 2019-10-03 RX ADMIN — Medication: at 08:22

## 2019-10-03 NOTE — WOUND CARE
10/03/19 0848 Wound Leg lower Left;Medial;Distal  
Date First Assessed/Time First Assessed: 06/18/19 0827   Wound Approximate Age at First Assessment (Weeks): 2 weeks  Primary Wound Type: Venous Ulcer  Location: Leg lower  Wound Location Orientation: Left;Medial;Distal  
Dressing Type Applied (Aquacel AG, gauze, unna boot) Patient discharged to home ambulatory, denied pain at time of discharge. She will return to clinic in 1 week for MD follow-up.  Wound culture was obtained today and sent to [facility].

## 2019-10-03 NOTE — WOUND CARE
10/03/19 8479 Wound Leg lower Left;Medial;Distal  
Date First Assessed/Time First Assessed: 06/18/19 0827   Wound Approximate Age at First Assessment (Weeks): 2 weeks  Primary Wound Type: Venous Ulcer  Location: Leg lower  Wound Location Orientation: Left;Medial;Distal  
Dressing Status Removed Dressing Type (Aquacel AG, unna boot) Wound Length (cm) 0.7 cm Wound Width (cm) 0.6 cm Wound Depth (cm) 0.1 cm Wound Volume (cm^3) 0.04 cm^3 Condition of Base Granulation Condition of Edges Open Drainage Amount Small Drainage Color Serosanguinous Wound Odor None Sidra-wound Assessment Intact Cleansing and Cleansing Agents  Normal saline Visit Vitals /51 Pulse (!) 47 Temp 96.9 °F (36.1 °C) Resp 16

## 2019-10-03 NOTE — PROGRESS NOTES
SUBJECTIVE:  The patient is a 53-year-old woman who was  seen at the [address] [place] [address] by Dr Cathryne Sandhoff 06/18/2019. Dino Kruse had presented in with a venous stasis ulcer, left lower extremity with associated cellulitis.  She was treated with Bactrim. (she did have history of MRSA in the past).    The patient had at last visit  Aquacell AG over ulcer, foam over ankle anteriorly, Unna boot.     She has finished taking doxycycline.  Culture 7/18/2019 grew MSSA.     The patient reports that her leg has greatly decreased pain at the ulcer site. 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  She also reports that she had been, in the past, treated for venous ulceration by [facility] with Dr. Mark Centeno and prefers use of the [facility]     The patient also reports that she has had venous intervention in the past by Dr. Adriana Rodrigues 8/1/2019.  This shows recanalization of the left GSV in the thigh and reflux in the SF junction.     The patient reports borderline diabetes which is managed with oral metformin.     OBJECTIVE:  On examination, the patient has 2+ left dorsalis pedis pulse.  There is trace edema in the lower leg.  There ia an ulcer medial distal leftleg, dimensions 0.7 x 0.6 x 0.1 cm.       Fair granulation on wound. Mild erythema around wound. Tenderness minimal    Culture taken of wound because of erythema.      No debridement performed.     Aquacell AG was placed on the ulcers.   Unna boot was then applied on the left.     The patient will see me in the [address] [place] [address] in 1 week.     The patient has appointment with Dr Mark Centeno regarding recurrence of venous reflux.   Likely need further intervention.     FINAL DIAGNOSIS:  Venous stasis ulcers, left lower leg.     L97.922, I83.009        Maik Ko MD

## 2019-10-06 LAB — BACTERIA SPEC AEROBE CULT: ABNORMAL

## 2019-10-07 RX ORDER — DOXYCYCLINE 100 MG/1
100 TABLET ORAL 2 TIMES DAILY
Qty: 20 TAB | Refills: 0 | Status: SHIPPED | OUTPATIENT
Start: 2019-10-07 | End: 2019-10-10

## 2019-10-07 RX ORDER — DOXYCYCLINE HYCLATE 100 MG
100 TABLET ORAL EVERY 12 HOURS
Status: DISCONTINUED | OUTPATIENT
Start: 2019-10-07 | End: 2019-10-07

## 2019-10-10 ENCOUNTER — HOSPITAL ENCOUNTER (OUTPATIENT)
Dept: WOUND CARE | Age: 54
Discharge: HOME | End: 2019-10-10
Payer: COMMERCIAL

## 2019-10-10 VITALS
TEMPERATURE: 96.7 F | SYSTOLIC BLOOD PRESSURE: 156 MMHG | HEART RATE: 54 BPM | RESPIRATION RATE: 18 BRPM | DIASTOLIC BLOOD PRESSURE: 72 MMHG

## 2019-10-10 PROCEDURE — 29580 STRAPPING UNNA BOOT: CPT

## 2019-10-10 PROCEDURE — 74011000250 HC RX REV CODE- 250: Performed by: SURGERY

## 2019-10-10 RX ADMIN — Medication: at 08:24

## 2019-10-10 NOTE — DISCHARGE INSTRUCTIONS
Discharge Instructions for  [place] [address] [address], [address] [facility]  Telephone: [phone]    NAME:  Ghazala Fernandes  YOB: 1965  MEDICAL RECORD NUMBER:  [id number]  DATE:  10/10/2019    Wound Cleansing:   Do not scrub or use excessive force. Cleanse wound prior to applying a clean dressing with:  [x] Normal Saline [] Keep Wound Dry in Shower    [] Wound Cleanser   [x] Cleanse wound with Mild Soap & Water  [] May Shower at Discharge   [] Other:      Topical Treatments:  Do not apply lotions, creams, or ointments to wound bed unless directed. [] Apply moisturizing lotion to skin surrounding the wound prior to dressing change.  [] Apply antifungal ointment to skin surrounding the wound prior to dressing change.  [] Apply thin film of moisture barrier ointment to skin immediately around wound. [] Other:       Dressings:           Wound Location Left medial ankle  [x] Apply Primary Dressing:       [] MediHoney Gel [x] Alginate with Silver [] Alginate   [] Collagen [] Collagen with Silver   [] Santyl with Moisten saline gauze     [] Hydrocolloid   [] MediHoney Alginate [] Foam with Silver   [] Foam   [] Hydrofera Blue    [] Mepilex Border    [] Moisten with Saline [] Hydrogel [] Mepitel     [] Bactroban/Mupirocin [] Polysporin  [] Other:    [] Pack wound loosely with  [] Iodoform   [] Plain Packing  [] Other   [x] Cover and Secure with:     [x] Gauze [] Fadia [] Kerlix   [] Ace Wrap [] Cover Roll Tape [] ABD     [] Other:    Avoid contact of tape with skin. [x] Change dressing: [] Daily    [] Every Other Day [] Three times per week   [x] Once a week [] Do Not Change Dressing   [] Other:       Compression:  Apply: [x] Multilayer Compression Wrap Applied in Clinic []RightLeg [x]Left Leg- Unna boot   [] Multi-layer compression. Do not get leg(s) with wrap wet. If wraps become too tight call the center or completely remove the wrap. [x] Elevate leg(s) above the level of the heart when sitting. [x] Avoid prolonged standing in one place. Dietary:  [x] Diet as tolerated: [x] Calorie Diabetic Diet: No sugar, low carb [x] No Added Salt:  [] Increase Protein: [] Other:   Activity:  [x] Activity as tolerated:  [] Patient has no activity restrictions     [] Strict Bedrest: [] Remain off Work:     [] May return to full duty work:                                   [] Return to work with restrictions:             Return Appointment:  [] Wound and dressing supply provider:   [] ECF or Home Healthcare:  [] Wound Assessment: [] Physician or NP scheduled for Wound Assessment:   [x] Return Appointment: With Dr. Cj Krueger  in [address] [place] [address])  [] Ordered tests:      Electronically signed Katheryn Mcgregor RN on 10/10/2019 at 8:38 AM     Melissa Bennett 281: Should you experience any significant changes in your wound(s) or have questions about your wound care, please contact the [facility] Main at [address] [place] [address] 8:00 am - 4:30. If you need help with your wound outside these hours and cannot wait until we are again available, contact your PCP or go to the hospital emergency room. PLEASE NOTE: IF YOU ARE UNABLE TO OBTAIN WOUND SUPPLIES, CONTINUE TO USE THE SUPPLIES YOU HAVE AVAILABLE UNTIL YOU ARE ABLE TO REACH US. IT IS MOST IMPORTANT TO KEEP THE WOUND COVERED AT ALL TIMES.      Physician Signature:_______________________    Date: ___________ Time:  ____________

## 2019-10-10 NOTE — WOUND CARE
10/10/19 3685 Wound Leg lower Left;Medial;Distal  
Date First Assessed/Time First Assessed: 06/18/19 0827   Wound Approximate Age at First Assessment (Weeks): 2 weeks  Primary Wound Type: Venous Ulcer  Location: Leg lower  Wound Location Orientation: Left;Medial;Distal  
Dressing Type Applied (Aquacel AG, gauze, Unna boot) Discharge Condition: Stable Pain: 0 Ambulatory Status: Walking Discharge Destination: Home Transportation: Car Accompanied by: Self Discharge instructions reviewed with Patient and copy or written instructions have been provided. All questions/concerns have been addressed at this time.

## 2019-10-10 NOTE — PROGRESS NOTES
SUBJECTIVE:  The patient is a 15-year-old woman who was  seen at the [address] [place] [address] by Dr Rosa Lee 06/18/2019. Nathan Hector had presented in with a venous stasis ulcer, left lower extremity with associated cellulitis.  She was treated with Bactrim. (she did have history of MRSA in the past).    The patient had at last visit  Aquacell AG over ulcer, foam over ankle anteriorly, Unna boot.     She has finished taking doxycycline.  Culture 7/18/2019 grew MSSA.     The patient reports that her leg has greatly decreased pain at the ulcer site. 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  She also reports that she had been, in the past, treated [facility] venous ulceration by [facility] with Dr. Abraham Marquez and prefers use of the [facility]     The patient also reports that she has had venous intervention in the past by Dr. Yadi Stout 8/1/2019.  This shows recanalization of the left GSV in the thigh and reflux in the SF junction.     The patient reports borderline diabetes which is managed with oral metformin. Culture on 10/3 2019 grew heavy MRSA. Doxycycline called in, but patient did not receive it because doxy monohydrate not covered by insurance.     OBJECTIVE:  On examination, the patient has 2+ left dorsalis pedis pulse.  There is trace edema in the lower leg.  There ia an ulcer medial distal leftleg, dimensions 0.6 x 0.4 x 0.1 cm.       Fair granulation on wound. Some erythema around wound.   Tenderness minimal      No debridement performed.     Aquacell AG was placed on the ulcers.   Unna boot was then applied on the left.     Rx for Doxycycline hyclate written 100 mg po bid for 7 days.     The patient will see me in the [address] [place] [address] in 1 week.     The patient is scheduled for repeat venous ablation in early November.     FINAL DIAGNOSIS:  Venous stasis ulcers, left lower leg.     L97.922, I83.009        Yesica Pathak MD

## 2019-10-10 NOTE — WOUND CARE
10/10/19 0820 Wound Leg lower Left;Medial;Distal  
Date First Assessed/Time First Assessed: 06/18/19 0827   Wound Approximate Age at First Assessment (Weeks): 2 weeks  Primary Wound Type: Venous Ulcer  Location: Leg lower  Wound Location Orientation: Left;Medial;Distal  
Dressing Status Removed Dressing Type (Aquacel Ag, gauze, Unna boot) Wound Length (cm) 0.6 cm Wound Width (cm) 0.4 cm Wound Depth (cm) 0.1 cm Wound Volume (cm^3) 0.02 cm^3 Condition of Base Granulation Condition of Edges Open Drainage Amount Scant Drainage Color Serosanguinous Wound Odor None Sidra-wound Assessment Intact Cleansing and Cleansing Agents  Normal saline; Soap and water Visit Vitals /72 (BP 1 Location: Left arm, BP Patient Position: At rest;Sitting) Pulse (!) 54 Temp 96.7 °F (35.9 °C) Resp 18 - - -

## 2019-10-17 ENCOUNTER — HOSPITAL ENCOUNTER (OUTPATIENT)
Dept: WOUND CARE | Age: 54
Discharge: HOME | End: 2019-10-17
Payer: COMMERCIAL

## 2019-10-17 VITALS
RESPIRATION RATE: 18 BRPM | DIASTOLIC BLOOD PRESSURE: 60 MMHG | TEMPERATURE: 96.1 F | SYSTOLIC BLOOD PRESSURE: 123 MMHG | HEART RATE: 48 BPM

## 2019-10-17 PROCEDURE — 74011000250 HC RX REV CODE- 250: Performed by: SURGERY

## 2019-10-17 PROCEDURE — 29580 STRAPPING UNNA BOOT: CPT

## 2019-10-17 RX ADMIN — Medication: at 08:20

## 2019-10-17 NOTE — WOUND CARE
10/17/19 4530 Wound Leg lower Left;Medial;Distal  
Date First Assessed/Time First Assessed: 06/18/19 0827   Wound Approximate Age at First Assessment (Weeks): 2 weeks  Primary Wound Type: Venous Ulcer  Location: Leg lower  Wound Location Orientation: Left;Medial;Distal  
Dressing Type Applied (adaptic, 4x4s. UNNA boot) Discharge Condition: Stable Pain: 0 Ambulatory Status: Walking Discharge Destination: Home Transportation: Car Accompanied by: Self Discharge instructions reviewed with Patient and copy or written instructions have been provided. All questions/concerns have been addressed at this time.

## 2019-10-17 NOTE — PROGRESS NOTES
SUBJECTIVE:  The patient is a 38-year-old woman who was  seen at the [address] [place] [address] by Dr Mat Gotti 06/18/2019. Dmitri Jeter had presented in with a venous stasis ulcer, left lower extremity with associated cellulitis.  She was treated with Bactrim. (she did have history of MRSA in the past).    The patient had at last visit  Aquacell AG over ulcer, foam over ankle anteriorly, Unna boot.     She has finished taking doxycycline.  Culture 7/18/2019 grew MSSA.     The patient reports that her leg has greatly decreased pain at the ulcer site. 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  She also reports that she had been, in the past, treated for venous ulceration by [facility] with Dr. Idalia Sibley and prefers use of the [employer].     The patient also reports that she has had venous intervention in the past by Dr. Montez Yeboah 8/1/2019.  This shows recanalization of the left GSV in the thigh and reflux in the SF junction. The patient is scheduled for repeat venous ablation in early November.     The patient reports borderline diabetes which is managed with oral metformin.     Culture on 10/3 2019 grew heavy MRSA. Doxycycline called in, but patient did not receive it because doxy monohydrate not covered by insurance.   Received Doxycycline hyclate written 100 mg po bid for 7 days.     OBJECTIVE:  On examination, the patient has 2+ left dorsalis pedis pulse.  There is trace edema in the lower leg.  There ia an ulcer medial distal leftleg, dimensions 0.3 x 0.2 x 0.1 cm.      Aquacell was very adherent.     Fair granulation on wound.        No debridement performed.     Adaptic was placed on the ulcer.   Unna boot was then applied on the left.          The patient will see me in the [address] [place] [address] in 1 week.          FINAL DIAGNOSIS:  Venous stasis ulcers, left lower leg.     L97.922, I83.009        Yvette Ramon MD

## 2019-10-17 NOTE — DISCHARGE INSTRUCTIONS
Discharge Instructions for  [place] [address] [facility], [address] [facility]  Telephone: [phone]    NAME:  Carey Caruso  YOB: 1965  MEDICAL RECORD NUMBER:  [id number]  DATE:  10/17/2019    Wound Cleansing:   Do not scrub or use excessive force. Wound cleansed prior to applying a clean dressing, with:  [x] Normal Saline [ ] Keep Wound Dry in Shower    [ ] Wound Cleanser   [ ] Cleanse wound with Mild Soap & Water  [ ] May Shower at Discharge   [ ] Other:         Dressings:           Wound Location Left- Lower Leg   [x] Apply Primary Dressing: Adaptic     her   [ ] Cover with:     [x] Gauze [ ] Jean Marie Peals [ ] Mickiel Taz   [ ] Wilhelmena Mary [ ] Skeet Stockton [ ] ABD     [ ] Other:    Avoid contact of tape with skin. [x] Change dressing: [ ] Daily    [ ] Every Other Day [ ] Three times per week   [ ] Once a week [x] Do Not Change Dressing   [ ] Other:      Compression:  Apply: [x] Multilayer Compression Wrap Applied in Clinic [ ]RightLeg [x]Left Leg - Abhinav Patience   [x] Multi-layer compression. Do not get leg(s) with wrap wet. If wraps become too tight call the center or completely remove                  the wrap. [x] Elevate leg(s) above the level of the heart when sitting. [x] Avoid prolonged standing in one place.     Dietary:  [x] Diet as tolerated: [ ] Calorie Diabetic Diet: [x] No Added Salt:  [ ] Increase Protein: [ ] Other:   Activity:  [x] Activity as tolerated:  [ ] Patient has no activity restrictions     [ ] Strict Bedrest: [ ] Remain off Work:     [ ] May return to full duty work:                                   [ ] Return to work with restrictions:   [address]   Return Appointment:  [ ] Wound and dressing supply provider:   [ ] ECF or Home Healthcare:  [ ] Wound Assessment: [ ] Physician or NP scheduled for Wound Assessment:   [x] Return Appointment: With Dr. Lynn Babcock in [facility])  [ ] Ordered tests:      Electronically signed Stephanie Sutton RN on 10/17/2019 at 8:27 AM     [facility] Information: Should you experience any significant changes in your wound(s) or have questions about your wound care, please contact the [address] at [address] [place] [address] 8:00 am - 4:30. If you need help with your wound outside these hours and cannot wait until we are again available, contact your PCP or go to the hospital emergency room. PLEASE NOTE: IF YOU ARE UNABLE TO OBTAIN WOUND SUPPLIES, CONTINUE TO USE THE SUPPLIES YOU HAVE AVAILABLE UNTIL YOU ARE ABLE TO REACH US. IT IS MOST IMPORTANT TO KEEP THE WOUND COVERED AT ALL TIMES.      Physician Signature:_______________________    Date: ___________ Time:  ____________

## 2019-10-17 NOTE — WOUND CARE
10/17/19 6093 Wound Leg lower Left;Medial;Distal  
Date First Assessed/Time First Assessed: 06/18/19 0827   Wound Approximate Age at First Assessment (Weeks): 2 weeks  Primary Wound Type: Venous Ulcer  Location: Leg lower  Wound Location Orientation: Left;Medial;Distal  
Dressing Status Removed Dressing Type (Aquacel Ag, Gauze, Unna boot) Wound Length (cm) 0.3 cm Wound Width (cm) 0.2 cm Wound Depth (cm) 0.1 cm Wound Volume (cm^3) 0.01 cm^3 Condition of Base Granulation Condition of Edges Open Drainage Amount Scant Drainage Color Serosanguinous Wound Odor None Sidra-wound Assessment Intact Cleansing and Cleansing Agents  Normal saline

## 2019-10-24 ENCOUNTER — HOSPITAL ENCOUNTER (OUTPATIENT)
Dept: WOUND CARE | Age: 54
Discharge: HOME | End: 2019-10-24
Payer: COMMERCIAL

## 2019-10-24 VITALS
HEART RATE: 51 BPM | SYSTOLIC BLOOD PRESSURE: 132 MMHG | DIASTOLIC BLOOD PRESSURE: 61 MMHG | RESPIRATION RATE: 18 BRPM | TEMPERATURE: 96.5 F

## 2019-10-24 PROCEDURE — 29580 STRAPPING UNNA BOOT: CPT

## 2019-10-24 PROCEDURE — 74011000250 HC RX REV CODE- 250: Performed by: SURGERY

## 2019-10-24 RX ADMIN — Medication: at 08:28

## 2019-10-24 NOTE — WOUND CARE
10/24/19 7627 Wound Leg lower Left;Medial;Distal  
Date First Assessed/Time First Assessed: 06/18/19 0827   Wound Approximate Age at First Assessment (Weeks): 2 weeks  Primary Wound Type: Venous Ulcer  Location: Leg lower  Wound Location Orientation: Left;Medial;Distal  
Dressing Status Removed Dressing Type Non adherent 
(Adaptic) Wound Length (cm) 0.6 cm Wound Width (cm) 0.5 cm Wound Depth (cm) 0.1 cm Wound Volume (cm^3) 0.03 cm^3 Condition of Base Granulation Condition of Edges Open Drainage Amount Small Drainage Color Serosanguinous Wound Odor None Sidra-wound Assessment Intact Cleansing and Cleansing Agents  Normal saline Visit Vitals /61 (BP 1 Location: Right arm, BP Patient Position: At rest;Sitting) Pulse (!) 51 Temp 96.5 °F (35.8 °C) Resp 18 LLE Peripheral Vascular Capillary Refill: Less than/equal to 3 seconds (10/24/19 0821) Color: Appropriate for race (10/24/19 1165) Temperature: Warm (10/24/19 4489) Sensation: Present (10/24/19 1180) Post-tibial Pulse: Palpable (10/24/19 0821) Pedal Pulse: Palpable (10/24/19 0821) Circumference of Calf (cm): 37.3 cm (10/24/19 0821) Location of Measurement (Calf): Mid  (10/24/19 2171) Circumference of Ankle (cm): 23.1 cm (10/24/19 0821) Location of Measurement (Ankle): Upper  (10/24/19 6914)

## 2019-10-24 NOTE — PROGRESS NOTES
SUBJECTIVE:  The patient is a 57-year-old woman who was  seen at the [address] [place] [address] by Dr Talya Esparza 06/18/2019. Kelly Davey had presented in with a venous stasis ulcer, left lower extremity with associated cellulitis.  She was treated with Bactrim. (she did have history of MRSA in the past).    The patient had at last visit  Aquacell AG over ulcer, foam over ankle anteriorly, Unna boot.     She has finished taking doxycycline.  Culture 7/18/2019 grew MSSA.     The patient reports that her leg has greatly decreased pain at the ulcer site. 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  She also reports that she had been, in the past, treated for venous ulceration by [facility] with Dr. Estephania Castellanos and prefers use of the [facility] [employer] [facility] [employer].     The patient also reports that she has had venous intervention in the past by Dr. Sharon Edwards 8/1/2019.  This shows recanalization of the left GSV in the thigh and reflux in the SF junction.     The patient is scheduled for repeat venous ablation in early November.     The patient reports borderline diabetes which is managed with oral metformin.     Culture on 10/3 2019 grew heavy MRSA.  Doxycycline called in, but patient did not receive it because doxy monohydrate not covered by insurance. Received Doxycycline hyclate written 100 mg po bid for 7 days.     OBJECTIVE:  On examination, the patient has 2+ left dorsalis pedis pulse.  There is trace edema in the lower leg.  There ia an ulcer medial distal left leg, dimensions 0.6 x 0.5 x 0.1 cm.       Minimal slough on wound.        No debridement performed.     Aquacell AG applied. Minh Every boot was then applied on the left.           Nurse visit in the [address] [place] [address] in 1 week.     Patient to see me in 2 weeks, the day after her repeat venous ablation.           FINAL DIAGNOSIS:  Venous stasis ulcers, left lower leg.     L97.922, I83.009        Marylee Prows, MD

## 2019-10-31 ENCOUNTER — HOSPITAL ENCOUNTER (OUTPATIENT)
Dept: WOUND CARE | Age: 54
End: 2019-10-31
Payer: COMMERCIAL

## 2019-10-31 ENCOUNTER — HOSPITAL ENCOUNTER (OUTPATIENT)
Dept: WOUND CARE | Age: 54
Discharge: HOME | End: 2019-10-31
Payer: COMMERCIAL

## 2019-10-31 VITALS
TEMPERATURE: 96.6 F | HEART RATE: 57 BPM | RESPIRATION RATE: 18 BRPM | DIASTOLIC BLOOD PRESSURE: 64 MMHG | SYSTOLIC BLOOD PRESSURE: 132 MMHG

## 2019-10-31 PROCEDURE — 29580 STRAPPING UNNA BOOT: CPT

## 2019-10-31 NOTE — WOUND CARE
Visit Vitals Temp 96.6 °F (35.9 °C) Resp 18 Discharge Condition: Stable Pain: 0 Ambulatory Status: Walking Discharge Destination: Home Transportation: Car Accompanied by: Self Discharge instructions reviewed with Patient and copy or written instructions have been provided. All questions/concerns have been addressed at this time.

## 2019-11-07 ENCOUNTER — HOSPITAL ENCOUNTER (OUTPATIENT)
Dept: WOUND CARE | Age: 54
Discharge: HOME | End: 2019-11-07
Payer: COMMERCIAL

## 2019-11-07 VITALS
HEART RATE: 66 BPM | DIASTOLIC BLOOD PRESSURE: 66 MMHG | SYSTOLIC BLOOD PRESSURE: 150 MMHG | TEMPERATURE: 97.7 F | RESPIRATION RATE: 18 BRPM

## 2019-11-07 PROCEDURE — 99212 OFFICE O/P EST SF 10 MIN: CPT

## 2019-11-07 RX ORDER — HYDROCODONE BITARTRATE AND ACETAMINOPHEN 5; 325 MG/1; MG/1
1 TABLET ORAL
COMMUNITY

## 2019-11-07 NOTE — PROGRESS NOTES
SUBJECTIVE:  The patient is a 49-year-old woman who was  seen at the [address] [place] [address] by Dr Jaime Mann 06/18/2019. Roel Samuels had presented in with a venous stasis ulcer, left lower extremity with associated cellulitis.  She was treated with Bactrim. (she did have history of MRSA in the past).    The patient had at last visit  Aquacell AG over ulcer, foam over ankle anteriorly, Unna boot.     She has finished taking doxycycline.  Culture 7/18/2019 grew MSSA.     The patient reports that her leg has greatly decreased pain at the ulcer site. 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  She also reports that she had been, in the past, treated for venous ulceration by [facility] with Dr. Lesvia Plasencia and prefers use of the [employer].     The patient also reports that she has had venous intervention in the past by Dr. Brandie Jackson 8/1/2019.  This shows recanalization of the left GSV in the thigh and reflux in the SF junction.     The patient was scheduled for repeat venous ablation in early November, but developed left knee inflammation. Procedure delayed to end of the month. .     The patient reports borderline diabetes which is managed with oral metformin.     Culture on 10/3 2019 grew heavy MRSA.  Doxycycline called in, but patient did not receive it because doxy monohydrate not covered by insurance.  Received Doxycycline hyclate written 100 mg po bid for 7 days.     OBJECTIVE:  On examination, the patient has 2+ left dorsalis pedis pulse.  There is trace edema in the lower leg.  There ia an dry scab medial distal left leg,   5 mm diameter.     Mepilex applied. Patient's elastic stocking applied.      She will continue use of the Mepilex pad, then her elastic stocking daily.     Follow up at the [address] [place] [address] only if ulcer redevelops.           FINAL DIAGNOSIS:  Venous stasis ulcers, left lower leg.     L97.922, I83.009        Alissa De La Cruz MD

## 2019-11-07 NOTE — WOUND CARE
11/07/19 9092 Wound Leg lower Left;Medial;Distal  
Date First Assessed/Time First Assessed: 06/18/19 0827   Wound Approximate Age at First Assessment (Weeks): 2 weeks  Primary Wound Type: Venous Ulcer  Location: Leg lower  Wound Location Orientation: Left;Medial;Distal  
Dressing Status Removed Dressing Type (Aquacel AG, compression stocking) Wound Length (cm) 0 cm Wound Width (cm) 0 cm Wound Depth (cm) 0 cm Wound Surface Area (cm^2) 0 cm^2 Wound Volume (cm^3) 0 cm^3 Condition of Base Epithelializing Condition of Edges Closed Drainage Amount None Wound Odor None Cleansing and Cleansing Agents  Normal saline Dressing Type Applied (Mepilex lite nonadherent foam, compression stocking) Discharge Condition: Stable Pain: 0 Ambulatory Status: Walking Discharge Destination: Home Transportation: Car Accompanied by: Self Discharge instructions reviewed with Patient and copy or written instructions have been provided. All questions/concerns have been addressed at this time.

## 2019-11-07 NOTE — WOUND CARE
Clinic Level of Care Assessment NAME:  Thierno Sandoval YOB: 1965 GENDER: female MEDICAL RECORD NUMBER:  [id number] DATE:  11/7/2019 Wound Count Document in Banner Number of Wounds Assessed Points No Wounds/Ulcers []   0 Less than Three Wounds/Ulcers [x]   1  
3-6 Wounds/Ulcers []   2 Greater than 6 Wounds/Ulcers []   3 Ambulation Status Document in Coord/EAMON/Mobility tab Status Definition Points Independent Independently able to ambulate. Fully able (without any assistance) to get on/off exam table/chair. [x]   0 Minimal Physical Assistance Requires physical assistance of one person to ambulate and/or position patient to be examined. Includes necessary physical assistance to position lower extremities on/off stool. []   1 Moderate Physical Assistance Requires at least one staff member to physically assist patient in ambulating into treatment room, and on/off exam table. []   2 Full Assistance Requires assistance of at least two staff members to transfer patient into treatment room and/or on/off exam table/chair. \"Total Transfer\". []   3 Dressing Complexity Document in Banner and Write Appropriate Order Complexity Definition Points No Dressing  []   0 Simple Minimal, simple dressing. i.e. Band-aid, gauze, simple wrap. [x]   1 Intermediate Moderately complicated requiring licensed personnel to apply i.e. collagen matrix, ointments, gels, alginates. []   2 Complex Complicated requiring licensed personnel to apply dressings 6 or more wounds. []   3 Teaching Effort Document in Education Tab Effort Definition Points No Teaching  []   0 Simple Reinforce two or less topics. Document in Education navigator. [x]   1 Intermediate Reinforce three to five topics and/or one additional  
new topic. Document in Education navigator. []   2 Complex Teach more than one new topic. New patient information packet reviewed and/or reinforce more than three topics. Document in Education navigator. HBO initial instruction. []   3 Patient Assessment and Planning Planning Definition Points Simple Multiple System Simple: Simple follow-up with routine assessment and planning. If Discharged, instructions and long term/follow-up care given to patient/caregiver. Discharged, instructions and/or After Visit Summary given to patient/caregiver and instructions completed. [x]   1 Intermediate Multiple System Intermediate: Contact with outside resources; i.e. Telephone calls to home health, [facility]. May include filling out forms and writing letters, arranging transportation, communication with insurance , vendors, etc.  Discharged, instructions and/or After Visit Summary given to patient/caregiver and instructions completed. []   2 Complex Multiple System Complex: Full, comprehensive assessment and planning. Follow the entire navigator under Wound Visit charting filling out each tab which includes OP Adm Database Screening, Education and CarePlan  HBO risk assessment completed. Discharged, instructions and/or After Visit Summary given to patient/caregiver and instructions completed. []   3 Is this the Patient's First Visit to the [address] [place] [address] Road No 
 
 
Is this Patient Established @ [facility] 
Yes Clinical Level of Care Points  0-2  Level 1 [] Points  3-5  Level 2 [x] Points  6-9  Level 3 [] Points  10-12  Level 4 [] Points  13-15  Level 5 [] Electronically signed by Clary Wilde RN on 11/7/2019 at 1:29 PM

## 2019-11-07 NOTE — DISCHARGE INSTRUCTIONS
Discharge Instructions for  [facility]  [address] [facility] [place] [address] [facility]  Telephone: [phone]    NAME:  Briseyda Petty  YOB: 1965  MEDICAL RECORD NUMBER:  [id number]  DATE:  11/7/2019    Wound Cleansing:   Do not scrub or use excessive force. Cleanse wound prior to applying a clean dressing with:  [] Normal Saline [] Keep Wound Dry in Shower    [] Wound Cleanser   [x] Cleanse wound with Mild Soap & Water  [] May Shower at Discharge   [] Other:      Topical Treatments:  Do not apply lotions, creams, or ointments to wound bed unless directed. [] Apply moisturizing lotion to skin surrounding the wound prior to dressing change.  [] Apply antifungal ointment to skin surrounding the wound prior to dressing change.  [] Apply thin film of moisture barrier ointment to skin immediately around wound. [] Other:       Dressings:           Wound Location Left medial ankle  [x] Apply Primary Dressing:       [] MediHoney Gel [] Alginate with Silver [] Alginate   [] Collagen [] Collagen with Silver   [] Santyl with Moisten saline gauze     [] Hydrocolloid   [] MediHoney Alginate [] Foam with Silver   [] Foam   [] Hydrofera Blue    [] Mepilex Border    [] Moisten with Saline [] Hydrogel [] Mepitel     [] Bactroban/Mupirocin [] Polysporin  [x] Other:  Mepilex lite nonadherent foam  [] Pack wound loosely with  [] Iodoform   [] Plain Packing  [] Other   [] Cover and Secure with:     [] Gauze [] Fadia [] Kerlix   [] Ace Wrap [] Cover Roll Tape [] ABD     [] Other:    Avoid contact of tape with skin.   [] Change dressing: [] Daily    [] Every Other Day [] Three times per week   [] Once a week [] Do Not Change Dressing   [] Other:       Edema Control:  Apply: [x] Compression Stocking []Right Leg [x]Left Leg   [] Tubigrip []Right Leg Double Layer []Left Leg Double Layer       []Right Leg Single Layer []Left Leg Single Layer   [] SpandaGrip []Right Leg []Left Leg      []Low compression 5-10 mm/Hg      []Medium compression 10-20 mm/Hg     []High compression  20-30 mm/Hg   every morning immediately when getting up should be applied to affected leg(s) from mid foot to knee making sure to cover the heel. Remove every night before going to bed. [x] Elevate leg(s) above the level of the heart when sitting. [x] Avoid prolonged standing in one place. [] Elevate arm/hand above the level of the heart []RightArm []LeftArm     Dietary:  [x] Diet as tolerated: [x] Calorie Diabetic Diet: No sugar, low carb [x] No Added Salt:  [] Increase Protein: [] Other:   Activity:  [x] Activity as tolerated:  [] Patient has no activity restrictions     [] Strict Bedrest: [] Remain off Work:     [] May return to full duty work:                                   [] Return to work with restrictions:             Return Appointment:  [] Wound and dressing supply provider:   [] ECF or Home Healthcare:  [] Wound Assessment: [] Physician or NP scheduled for Wound Assessment:   [x] Return Appointment: No return visit required  [] Ordered tests:      Electronically signed Negra Soni RN on 11/7/2019 at 8:46 AM     Melissa Bennett 281: Should you experience any significant changes in your wound(s) or have questions about your wound care, please contact the [facility] at [address] [place] [address] 8:00 am - 4:30. If you need help with your wound outside these hours and cannot wait until we are again available, contact your PCP or go to the hospital emergency room. PLEASE NOTE: IF YOU ARE UNABLE TO OBTAIN WOUND SUPPLIES, CONTINUE TO USE THE SUPPLIES YOU HAVE AVAILABLE UNTIL YOU ARE ABLE TO REACH US. IT IS MOST IMPORTANT TO KEEP THE WOUND COVERED AT ALL TIMES.      Physician Signature:_______________________    Date: ___________ Time:  ____________

## 2019-11-07 NOTE — WOUND CARE
11/07/19 3464 Wound Leg lower Left;Medial;Distal  
Date First Assessed/Time First Assessed: 06/18/19 0827   Wound Approximate Age at First Assessment (Weeks): 2 weeks  Primary Wound Type: Venous Ulcer  Location: Leg lower  Wound Location Orientation: Left;Medial;Distal  
Dressing Status Removed Dressing Type (Aquacel AG, compression stocking) Wound Length (cm) 0 cm Wound Width (cm) 0 cm Wound Depth (cm) 0 cm Wound Surface Area (cm^2) 0 cm^2 Wound Volume (cm^3) 0 cm^3 Condition of Base Epithelializing Condition of Edges Closed Drainage Amount None Wound Odor None Cleansing and Cleansing Agents  Normal saline Visit Vitals /66 (BP 1 Location: Right arm, BP Patient Position: At rest) Pulse 66 Temp 97.7 °F (36.5 °C) Resp 18

## 2021-10-23 PROBLEM — Z96.659 INFECTED PROSTHETIC KNEE JOINT, SUBSEQUENT ENCOUNTER: Status: ACTIVE | Noted: 2021-10-23

## 2021-10-23 PROBLEM — T84.59XD INFECTED PROSTHETIC KNEE JOINT, SUBSEQUENT ENCOUNTER: Status: ACTIVE | Noted: 2021-10-23

## 2021-10-23 RX ORDER — CITALOPRAM 20 MG/1
TABLET, FILM COATED ORAL DAILY
COMMUNITY

## 2021-11-29 ENCOUNTER — OFFICE VISIT (OUTPATIENT)
Dept: ORTHOPEDIC SURGERY | Age: 56
End: 2021-11-29
Payer: COMMERCIAL

## 2021-11-29 VITALS — WEIGHT: 264 LBS | HEIGHT: 68 IN | BODY MASS INDEX: 40.01 KG/M2

## 2021-11-29 DIAGNOSIS — Z89.529 ACQUIRED ABSENCE OF KNEE JOINT FOLLOWING EXPLANTATION OF JOINT PROSTHESIS WITH PRESENCE OF ANTIBIOTIC-IMPREGNATED CEMENT SPACER: ICD-10-CM

## 2021-11-29 DIAGNOSIS — Z96.652 STATUS POST REVISION OF TOTAL REPLACEMENT OF LEFT KNEE: Primary | ICD-10-CM

## 2021-11-29 PROCEDURE — 99212 OFFICE O/P EST SF 10 MIN: CPT | Performed by: PHYSICIAN ASSISTANT

## 2021-11-29 NOTE — PROGRESS NOTES
Chaparrita Cota (: 1965) is a 60 y.o. female, patient, here for evaluation of the following chief complaint(s):  Knee Pain (left knee) and Surgical Follow-up       SUBJECTIVE/OBJECTIVE:  Chaparrita Cota presents today for a postop visit s/p left knee revision, implantation of antibiotic impregnated spacer completed February 3, 2021 for infection and chronic wound drainage in conjunction with gastroc flap completed by Dr. Leigha Churchill at the same time. She was initially on IV antibiotics followed by 6 months of oral suppression. She finished all antibiotics . She has not had any pain. She does not want surgery at this time. States she can do whatever she wants. Relies on her cane for ambulation. No wound issues. PHYSICAL EXAM:  Vitals: Ht 5' 7.5\" (1.715 m)   Wt 264 lb (119.7 kg)   BMI 40.74 kg/m²   Body mass index is 40.74 kg/m². 60y.o. year old F in no acute distress. Ambulates with a slight limp on the left, cane in the right hand for assistance. Skin graft over the muscle flap is completely healed. No warmth or erythema. She lacks 10 to 15 degrees of extension, with flexion to approximately 80 or 90. Motor 5/5 otherwise. No distal edema. IMAGING:  Radiographs: No new x-rays today. ASSESSMENT/PLAN:  1. Status post revision of total replacement of left knee  -     C REACTIVE PROTEIN, QT; Future  -     SED RATE (ESR); Future  2. 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 She is off of all antibiotics. Denies pain. She continues to be very active. She is not looking for surgery at this point. She understands that she can have catastrophic failure of the antibiotic spacer at any time.   We will see her back in 3 months for her routine 1 year postop visit and x-rays. She was given a lab slip today to obtain ESR and CRP 1 week prior to that visit. Follow-up sooner if any significant change. Return in about 3 months (around 2/28/2022). Review Of Systems  ROS     Positive for: Musculoskeletal    Last edited by David Gerard RN on 11/29/2021 11:31 AM. (History)         Patient denies any recent fever, chills, nausea, vomiting, chest pain, or shortness of breath. Allergies   Allergen Reactions    Cortisone Itching    Lidocaine Unknown (comments)    Miralax [Polyethylene Glycol 3350] Itching       Current Outpatient Medications   Medication Sig    celecoxib (CELEBREX) 200 mg capsule Take 200 mg by mouth two (2) times a day.  flaxseed oil 1,000 mg cap Take 1 Tab by mouth daily.  docusate sodium (STOOL SOFTENER) 100 mg capsule Take 100 mg by mouth two (2) times daily as needed for Constipation.  atorvastatin (LIPITOR) 40 mg tablet Take 40 mg by mouth daily.  furosemide (LASIX) 20 mg tablet Take 20 mg by mouth daily.  citalopram (CELEXA) 20 mg tablet Take 20 mg by mouth daily.  lisinopril-hydrochlorothiazide (PRINZIDE, ZESTORETIC) 20-25 mg per tablet Take 1 Tab by mouth daily.  metoprolol (LOPRESSOR) 25 mg tablet Take 25 mg by mouth daily.  citalopram (CELEXA) 20 mg tablet Take  by mouth daily. (Patient not taking: Reported on 11/29/2021)    HYDROcodone-acetaminophen (NORCO) 5-325 mg per tablet Take 1 Tab by mouth every six (6) hours as needed for Pain. (Patient not taking: Reported on 11/29/2021)    senna-docusate (PERICOLACE) 8.6-50 mg per tablet Take 1 Tab by mouth daily. (Patient not taking: Reported on 11/29/2021)    metFORMIN (GLUCOPHAGE) 500 mg tablet Take 500 mg by mouth daily (with breakfast). (Patient not taking: Reported on 11/29/2021)     No current facility-administered medications for this visit.        Past Medical History:   Diagnosis Date    Arthritis     Deep vein thrombosis (DVT) (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    MI (myocardial infarction) ([facility] [address]     2 stents        Past Surgical History:   Procedure Laterality Date    HX  SECTION      HX KNEE REPLACEMENT Left     HX ORTHOPAEDIC Left     radical debridement w/exchange of ABX spacer    AR CARDIAC SURG PROCEDURE UNLIST      x2 stents    VASCULAR SURGERY PROCEDURE UNLIST      angioplasty       Family History   Problem Relation Age of Onset    Arthritis-osteo Mother     Hypertension Mother     Heart Disease Father     Arthritis-osteo Father     Hypertension Father     High Cholesterol Father     Stroke Sister     Arthritis-osteo Sister     Diabetes Sister     Heart Disease Sister     Hypertension Sister     High Cholesterol Sister         Social History     Socioeconomic History    Marital status:      Spouse name: Not on file    Number of children: Not on file    Years of education: Not on file    Highest education level: Not on file   Occupational History    Not on file   Tobacco Use    Smoking status: Former Smoker     Packs/day: 1.00     Years: 30.00     Pack years: 30.00    Smokeless tobacco: Never Used   Substance and Sexual Activity    Alcohol use: No    Drug use: No    Sexual activity: Not on file   Other Topics Concern    Not on file   Social History Narrative    Not on file     Social Determinants of Health     Financial Resource Strain:     Difficulty of Paying Living Expenses: Not on file   Food Insecurity:     Worried About Running Out of Food in the Last Year: Not on file    Svetlana of Food in the Last Year: Not on file   Transportation Needs:     Lack of Transportation (Medical): Not on file    Lack of Transportation (Non-Medical):  Not on file   Physical Activity:     Days of Exercise per Week: Not on file    Minutes of Exercise per Session: Not on file   Stress:     Feeling of Stress : Not on file   Social Connections:     Frequency of Communication with Friends and Family: Not on file    Frequency of Social Gatherings with Friends and Family: Not on file    Attends Christian Services: Not on file    Active Member of [address] [place] [address] or Organizations: Not on file    Attends Club or Organization Meetings: Not on file    Marital Status: Not on file   Intimate Partner Violence:     Fear of Current or Ex-Partner: Not on file    Emotionally Abused: Not on file    Physically Abused: Not on file    Sexually Abused: Not on file   Housing Stability:     Unable to Pay for Housing in the Last Year: Not on file    Number of [place] in the Last Year: Not on file    Unstable Housing in the Last Year: Not on file         Orders Placed This Encounter    C REACTIVE PROTEIN, QT     Standing Status:   Future     Number of Occurrences:   1     Standing Expiration Date:   11/29/2022    SED RATE (ESR)     Standing Status:   Future     Number of Occurrences:   1     Standing Expiration Date:   11/29/2022        Shavon Pena. Marco Izquierdo M.D. was available for immediate consultation as the supervising physician. An electronic signature was used to authenticate this note.   -- Vilma Oneill PA-C

## 2022-03-19 PROBLEM — Z96.659 INFECTED PROSTHETIC KNEE JOINT, SUBSEQUENT ENCOUNTER: Status: ACTIVE | Noted: 2021-10-23

## 2022-03-19 PROBLEM — T84.59XD INFECTED PROSTHETIC KNEE JOINT, SUBSEQUENT ENCOUNTER: Status: ACTIVE | Noted: 2021-10-23

## 2022-03-19 PROBLEM — M17.12 ARTHRITIS OF KNEE, LEFT: Status: ACTIVE | Noted: 2017-04-19

## 2022-03-20 PROBLEM — L97.922 NON-PRESSURE CHRONIC ULCER OF LEFT LOWER LEG WITH FAT LAYER EXPOSED (HCC): Status: ACTIVE | Noted: 2019-07-11

## 2022-03-20 PROBLEM — M17.12 PRIMARY LOCALIZED OSTEOARTHRITIS OF LEFT KNEE: Status: ACTIVE | Noted: 2017-04-19

## 2022-10-07 LAB — MAMMOGRAPHY, EXTERNAL: NORMAL

## 2023-09-15 SDOH — HEALTH STABILITY: PHYSICAL HEALTH: ON AVERAGE, HOW MANY MINUTES DO YOU ENGAGE IN EXERCISE AT THIS LEVEL?: 30 MIN

## 2023-09-15 SDOH — HEALTH STABILITY: PHYSICAL HEALTH: ON AVERAGE, HOW MANY DAYS PER WEEK DO YOU ENGAGE IN MODERATE TO STRENUOUS EXERCISE (LIKE A BRISK WALK)?: 0 DAYS

## 2023-09-17 PROBLEM — J43.2 CENTRILOBULAR EMPHYSEMA (HCC): Status: ACTIVE | Noted: 2021-08-31

## 2023-09-17 PROBLEM — I21.9 MYOCARDIAL INFARCTION (HCC): Status: ACTIVE | Noted: 2021-10-26

## 2023-09-17 PROBLEM — I35.9 AORTIC VALVE CALCIFICATION: Status: ACTIVE | Noted: 2021-08-31

## 2023-09-17 PROBLEM — Z96.659 INFECTED PROSTHETIC KNEE JOINT, SUBSEQUENT ENCOUNTER: Status: RESOLVED | Noted: 2021-10-23 | Resolved: 2023-09-17

## 2023-09-17 PROBLEM — I25.2 HISTORY OF MI (MYOCARDIAL INFARCTION): Status: ACTIVE | Noted: 2021-10-26

## 2023-09-17 PROBLEM — T84.59XD INFECTED PROSTHETIC KNEE JOINT, SUBSEQUENT ENCOUNTER: Status: RESOLVED | Noted: 2021-10-23 | Resolved: 2023-09-17

## 2023-09-17 PROBLEM — L97.922 NON-PRESSURE CHRONIC ULCER OF LEFT LOWER LEG WITH FAT LAYER EXPOSED (HCC): Status: RESOLVED | Noted: 2019-07-11 | Resolved: 2023-09-17

## 2023-09-17 PROBLEM — I10 HYPERTENSION: Status: ACTIVE | Noted: 2021-10-26

## 2023-09-17 PROBLEM — I10 HYPERTENSION: Status: RESOLVED | Noted: 2021-10-26 | Resolved: 2023-09-17

## 2023-09-17 PROBLEM — N85.00 ENDOMETRIAL HYPERPLASIA: Status: ACTIVE | Noted: 2021-09-27

## 2023-09-17 PROBLEM — M19.90 OSTEOARTHROSIS: Status: ACTIVE | Noted: 2019-01-01

## 2023-09-17 PROBLEM — E55.9 VITAMIN D DEFICIENCY: Status: ACTIVE | Noted: 2017-03-23

## 2023-09-17 PROBLEM — E66.01 OBESITY, MORBID, BMI 40.0-49.9 (HCC): Status: ACTIVE | Noted: 2021-08-16

## 2023-09-17 PROBLEM — R01.1 NEWLY RECOGNIZED HEART MURMUR: Status: ACTIVE | Noted: 2021-08-16

## 2023-09-17 PROBLEM — R73.03 PREDIABETES: Status: ACTIVE | Noted: 2023-09-17

## 2023-09-17 PROBLEM — M17.12 ARTHRITIS OF KNEE, LEFT: Status: RESOLVED | Noted: 2017-04-19 | Resolved: 2023-09-17

## 2023-09-17 PROBLEM — E53.8 B12 DEFICIENCY: Status: ACTIVE | Noted: 2017-06-29

## 2023-09-17 PROBLEM — M17.12 PRIMARY LOCALIZED OSTEOARTHRITIS OF LEFT KNEE: Status: RESOLVED | Noted: 2017-04-19 | Resolved: 2023-09-17

## 2023-09-18 ENCOUNTER — OFFICE VISIT (OUTPATIENT)
Facility: CLINIC | Age: 58
End: 2023-09-18
Payer: COMMERCIAL

## 2023-09-18 VITALS
TEMPERATURE: 98.2 F | RESPIRATION RATE: 18 BRPM | OXYGEN SATURATION: 96 % | BODY MASS INDEX: 35.58 KG/M2 | HEIGHT: 68 IN | SYSTOLIC BLOOD PRESSURE: 135 MMHG | HEART RATE: 53 BPM | DIASTOLIC BLOOD PRESSURE: 85 MMHG | WEIGHT: 234.8 LBS

## 2023-09-18 DIAGNOSIS — I25.10 CORONARY ARTERY DISEASE INVOLVING NATIVE CORONARY ARTERY OF NATIVE HEART WITHOUT ANGINA PECTORIS: ICD-10-CM

## 2023-09-18 DIAGNOSIS — Z23 NEED FOR IMMUNIZATION AGAINST INFLUENZA: ICD-10-CM

## 2023-09-18 DIAGNOSIS — G47.09 OTHER INSOMNIA: Primary | ICD-10-CM

## 2023-09-18 DIAGNOSIS — E78.2 MIXED HYPERLIPIDEMIA: ICD-10-CM

## 2023-09-18 DIAGNOSIS — R73.03 PREDIABETES: ICD-10-CM

## 2023-09-18 DIAGNOSIS — E66.01 SEVERE OBESITY (BMI 35.0-35.9 WITH COMORBIDITY) (HCC): ICD-10-CM

## 2023-09-18 DIAGNOSIS — I10 BENIGN ESSENTIAL HYPERTENSION: ICD-10-CM

## 2023-09-18 DIAGNOSIS — N95.9 POST MENOPAUSAL PROBLEMS: ICD-10-CM

## 2023-09-18 PROCEDURE — 3079F DIAST BP 80-89 MM HG: CPT | Performed by: NURSE PRACTITIONER

## 2023-09-18 PROCEDURE — 99214 OFFICE O/P EST MOD 30 MIN: CPT | Performed by: NURSE PRACTITIONER

## 2023-09-18 PROCEDURE — 90674 CCIIV4 VAC NO PRSV 0.5 ML IM: CPT | Performed by: NURSE PRACTITIONER

## 2023-09-18 PROCEDURE — 90471 IMMUNIZATION ADMIN: CPT | Performed by: NURSE PRACTITIONER

## 2023-09-18 PROCEDURE — 3075F SYST BP GE 130 - 139MM HG: CPT | Performed by: NURSE PRACTITIONER

## 2023-09-18 RX ORDER — CELECOXIB 100 MG/1
100 CAPSULE ORAL 2 TIMES DAILY
Qty: 180 CAPSULE | Refills: 1 | Status: SHIPPED | OUTPATIENT
Start: 2023-09-18

## 2023-09-18 RX ORDER — BUPROPION HYDROCHLORIDE 300 MG/1
300 TABLET ORAL EVERY MORNING
Qty: 90 TABLET | Refills: 3 | Status: SHIPPED | OUTPATIENT
Start: 2023-09-18

## 2023-09-18 RX ORDER — BUPROPION HYDROCHLORIDE 300 MG/1
300 TABLET ORAL EVERY MORNING
COMMUNITY
Start: 2022-02-22 | End: 2023-09-18 | Stop reason: SDUPTHER

## 2023-09-18 RX ORDER — TRAZODONE HYDROCHLORIDE 50 MG/1
TABLET ORAL
Qty: 90 TABLET | Refills: 1 | Status: SHIPPED | OUTPATIENT
Start: 2023-09-18

## 2023-09-18 RX ORDER — FUROSEMIDE 20 MG/1
20 TABLET ORAL DAILY
Qty: 90 TABLET | Refills: 1 | Status: SHIPPED | OUTPATIENT
Start: 2023-09-18

## 2023-09-18 RX ORDER — CELECOXIB 200 MG/1
200 CAPSULE ORAL 2 TIMES DAILY
Qty: 180 CAPSULE | OUTPATIENT
Start: 2023-09-18

## 2023-09-18 RX ORDER — CITALOPRAM 20 MG/1
20 TABLET ORAL DAILY
Qty: 90 TABLET | Refills: 1 | Status: SHIPPED | OUTPATIENT
Start: 2023-09-18

## 2023-09-18 RX ORDER — SEMAGLUTIDE 1.34 MG/ML
0.5 INJECTION, SOLUTION SUBCUTANEOUS WEEKLY
COMMUNITY
Start: 2022-10-05 | End: 2023-09-18

## 2023-09-18 RX ORDER — NALTREXONE HYDROCHLORIDE 50 MG/1
TABLET, FILM COATED ORAL
COMMUNITY
Start: 2023-07-27 | End: 2023-09-18 | Stop reason: SDUPTHER

## 2023-09-18 RX ORDER — ATORVASTATIN CALCIUM 80 MG/1
80 TABLET, FILM COATED ORAL DAILY
Qty: 90 TABLET | Refills: 1 | Status: SHIPPED | OUTPATIENT
Start: 2023-09-18

## 2023-09-18 RX ORDER — NALTREXONE HYDROCHLORIDE 50 MG/1
50 TABLET, FILM COATED ORAL DAILY
Qty: 90 TABLET | Refills: 1 | Status: SHIPPED | OUTPATIENT
Start: 2023-09-18

## 2023-09-18 RX ORDER — LISINOPRIL AND HYDROCHLOROTHIAZIDE 20; 12.5 MG/1; MG/1
2 TABLET ORAL DAILY
Qty: 180 TABLET | Refills: 1 | Status: SHIPPED | OUTPATIENT
Start: 2023-09-18

## 2023-09-18 SDOH — ECONOMIC STABILITY: HOUSING INSECURITY
IN THE LAST 12 MONTHS, WAS THERE A TIME WHEN YOU DID NOT HAVE A STEADY PLACE TO SLEEP OR SLEPT IN A SHELTER (INCLUDING NOW)?: NO

## 2023-09-18 SDOH — ECONOMIC STABILITY: INCOME INSECURITY: HOW HARD IS IT FOR YOU TO PAY FOR THE VERY BASICS LIKE FOOD, HOUSING, MEDICAL CARE, AND HEATING?: SOMEWHAT HARD

## 2023-09-18 SDOH — ECONOMIC STABILITY: FOOD INSECURITY: WITHIN THE PAST 12 MONTHS, YOU WORRIED THAT YOUR FOOD WOULD RUN OUT BEFORE YOU GOT MONEY TO BUY MORE.: SOMETIMES TRUE

## 2023-09-18 SDOH — ECONOMIC STABILITY: FOOD INSECURITY: WITHIN THE PAST 12 MONTHS, THE FOOD YOU BOUGHT JUST DIDN'T LAST AND YOU DIDN'T HAVE MONEY TO GET MORE.: SOMETIMES TRUE

## 2023-09-18 ASSESSMENT — PATIENT HEALTH QUESTIONNAIRE - PHQ9
SUM OF ALL RESPONSES TO PHQ QUESTIONS 1-9: 0
1. LITTLE INTEREST OR PLEASURE IN DOING THINGS: 0
2. FEELING DOWN, DEPRESSED OR HOPELESS: 0
SUM OF ALL RESPONSES TO PHQ9 QUESTIONS 1 & 2: 0
SUM OF ALL RESPONSES TO PHQ QUESTIONS 1-9: 0

## 2023-09-19 PROBLEM — Z87.891 HISTORY OF NICOTINE DEPENDENCE: Status: ACTIVE | Noted: 2023-09-19

## 2023-09-19 LAB
ALBUMIN SERPL-MCNC: 3.8 G/DL (ref 3.5–5)
ALBUMIN/GLOB SERPL: 1.1 (ref 1.1–2.2)
ALP SERPL-CCNC: 87 U/L (ref 45–117)
ALT SERPL-CCNC: 26 U/L (ref 12–78)
ANION GAP SERPL CALC-SCNC: 2 MMOL/L (ref 5–15)
AST SERPL-CCNC: 12 U/L (ref 15–37)
BASOPHILS # BLD: 0.1 K/UL (ref 0–0.1)
BASOPHILS NFR BLD: 1 % (ref 0–1)
BILIRUB SERPL-MCNC: 0.4 MG/DL (ref 0.2–1)
BUN SERPL-MCNC: 22 MG/DL (ref 6–20)
BUN/CREAT SERPL: 21 (ref 12–20)
CALCIUM SERPL-MCNC: 9.5 MG/DL (ref 8.5–10.1)
CHLORIDE SERPL-SCNC: 105 MMOL/L (ref 97–108)
CHOLEST SERPL-MCNC: 113 MG/DL
CO2 SERPL-SCNC: 32 MMOL/L (ref 21–32)
CREAT SERPL-MCNC: 1.04 MG/DL (ref 0.55–1.02)
DIFFERENTIAL METHOD BLD: ABNORMAL
EOSINOPHIL # BLD: 0.3 K/UL (ref 0–0.4)
EOSINOPHIL NFR BLD: 3 % (ref 0–7)
ERYTHROCYTE [DISTWIDTH] IN BLOOD BY AUTOMATED COUNT: 13.1 % (ref 11.5–14.5)
EST. AVERAGE GLUCOSE BLD GHB EST-MCNC: 94 MG/DL
GLOBULIN SER CALC-MCNC: 3.4 G/DL (ref 2–4)
GLUCOSE SERPL-MCNC: 93 MG/DL (ref 65–100)
HBA1C MFR BLD: 4.9 % (ref 4–5.6)
HCT VFR BLD AUTO: 40.7 % (ref 35–47)
HDLC SERPL-MCNC: 42 MG/DL
HDLC SERPL: 2.7 (ref 0–5)
HGB BLD-MCNC: 12.7 G/DL (ref 11.5–16)
IMM GRANULOCYTES # BLD AUTO: 0.1 K/UL (ref 0–0.04)
IMM GRANULOCYTES NFR BLD AUTO: 1 % (ref 0–0.5)
LDLC SERPL CALC-MCNC: 50.8 MG/DL (ref 0–100)
LYMPHOCYTES # BLD: 2.8 K/UL (ref 0.8–3.5)
LYMPHOCYTES NFR BLD: 29 % (ref 12–49)
MCH RBC QN AUTO: 27.7 PG (ref 26–34)
MCHC RBC AUTO-ENTMCNC: 31.2 G/DL (ref 30–36.5)
MCV RBC AUTO: 88.9 FL (ref 80–99)
MONOCYTES # BLD: 0.8 K/UL (ref 0–1)
MONOCYTES NFR BLD: 8 % (ref 5–13)
NEUTS SEG # BLD: 5.6 K/UL (ref 1.8–8)
NEUTS SEG NFR BLD: 58 % (ref 32–75)
NRBC # BLD: 0 K/UL (ref 0–0.01)
NRBC BLD-RTO: 0 PER 100 WBC
PLATELET # BLD AUTO: 249 K/UL (ref 150–400)
PMV BLD AUTO: 12.5 FL (ref 8.9–12.9)
POTASSIUM SERPL-SCNC: 4.6 MMOL/L (ref 3.5–5.1)
PROT SERPL-MCNC: 7.2 G/DL (ref 6.4–8.2)
RBC # BLD AUTO: 4.58 M/UL (ref 3.8–5.2)
SODIUM SERPL-SCNC: 139 MMOL/L (ref 136–145)
TRIGL SERPL-MCNC: 101 MG/DL
VLDLC SERPL CALC-MCNC: 20.2 MG/DL
WBC # BLD AUTO: 9.5 K/UL (ref 3.6–11)

## 2023-10-11 DIAGNOSIS — E78.2 MIXED HYPERLIPIDEMIA: ICD-10-CM

## 2023-10-11 DIAGNOSIS — E66.01 SEVERE OBESITY (BMI 35.0-35.9 WITH COMORBIDITY) (HCC): ICD-10-CM

## 2023-10-11 DIAGNOSIS — R73.03 PREDIABETES: ICD-10-CM

## 2023-12-06 DIAGNOSIS — G47.09 OTHER INSOMNIA: ICD-10-CM

## 2023-12-07 RX ORDER — TRAZODONE HYDROCHLORIDE 50 MG/1
TABLET ORAL
Qty: 90 TABLET | Refills: 3 | Status: SHIPPED | OUTPATIENT
Start: 2023-12-07

## 2023-12-07 NOTE — TELEPHONE ENCOUNTER
PCP: KATHIE Salazar CNP     Last appt:  9/18/2023      Future Appointments   Date Time Provider [address]   12/14/2023  9:00 AM [address]   3/18/2024 10:00 AM KATHIE Winchester CNP [facility]          Requested Prescriptions     Pending Prescriptions Disp Refills    traZODone (DESYREL) 50 MG tablet [Pharmacy Med Name: TRAZODONE HYDROCHLORIDE 50 MG Tablet] 90 tablet 3     Sig: TAKE 1/2 TO 1 TABLET AT BEDTIME

## 2023-12-14 ENCOUNTER — HOSPITAL ENCOUNTER (OUTPATIENT)
Facility: HOSPITAL | Age: 58
Discharge: HOME | End: 2023-12-14
Payer: MEDICARE

## 2023-12-14 DIAGNOSIS — N95.9 POST MENOPAUSAL PROBLEMS: ICD-10-CM

## 2023-12-14 PROCEDURE — 77080 DXA BONE DENSITY AXIAL: CPT

## 2023-12-15 RX ORDER — FUROSEMIDE 20 MG/1
TABLET ORAL
Qty: 90 TABLET | Refills: 1 | Status: SHIPPED | OUTPATIENT
Start: 2023-12-15

## 2023-12-15 NOTE — TELEPHONE ENCOUNTER
PCP: KATHIE Lopez CNP     Last appt:  9/18/2023      Future Appointments   Date Time Provider [address]   3/18/2024 10:00 AM KATHIE Lopez CNP [facility]          Requested Prescriptions     Pending Prescriptions Disp Refills    furosemide (LASIX) 20 MG tablet [Pharmacy Med Name: FUROSEMIDE 20 MG Tablet] 90 tablet 3     Sig: TAKE 1 TABLET ONE TIME DAILY 90

## 2024-01-19 PROBLEM — Z96.652 PRESENCE OF LEFT ARTIFICIAL KNEE JOINT: Status: ACTIVE | Noted: 2024-01-19

## 2024-01-19 PROBLEM — T84.54XA INFECTION OF TOTAL LEFT KNEE REPLACEMENT (HCC): Status: ACTIVE | Noted: 2024-01-19

## 2024-01-25 ENCOUNTER — OFFICE VISIT (OUTPATIENT)
Facility: CLINIC | Age: 59
End: 2024-01-25

## 2024-01-25 VITALS
OXYGEN SATURATION: 96 % | HEART RATE: 57 BPM | HEIGHT: 67 IN | RESPIRATION RATE: 18 BRPM | SYSTOLIC BLOOD PRESSURE: 103 MMHG | BODY MASS INDEX: 35.75 KG/M2 | TEMPERATURE: 98.3 F | WEIGHT: 227.8 LBS | DIASTOLIC BLOOD PRESSURE: 64 MMHG

## 2024-01-25 DIAGNOSIS — I25.2 HISTORY OF MI (MYOCARDIAL INFARCTION): ICD-10-CM

## 2024-01-25 DIAGNOSIS — M25.562 CHRONIC PAIN OF LEFT KNEE: ICD-10-CM

## 2024-01-25 DIAGNOSIS — E66.01 SEVERE OBESITY (BMI 35.0-35.9 WITH COMORBIDITY) (HCC): ICD-10-CM

## 2024-01-25 DIAGNOSIS — R01.1 CARDIAC MURMUR: ICD-10-CM

## 2024-01-25 DIAGNOSIS — Z23 NEED FOR PROPHYLACTIC VACCINATION AGAINST STREPTOCOCCUS PNEUMONIAE (PNEUMOCOCCUS): ICD-10-CM

## 2024-01-25 DIAGNOSIS — G47.09 OTHER INSOMNIA: ICD-10-CM

## 2024-01-25 DIAGNOSIS — I25.10 CORONARY ARTERY DISEASE INVOLVING NATIVE CORONARY ARTERY OF NATIVE HEART WITHOUT ANGINA PECTORIS: ICD-10-CM

## 2024-01-25 DIAGNOSIS — I35.9 AORTIC VALVE CALCIFICATION: ICD-10-CM

## 2024-01-25 DIAGNOSIS — G89.29 CHRONIC PAIN OF LEFT KNEE: ICD-10-CM

## 2024-01-25 DIAGNOSIS — Z01.818 PREOPERATIVE EXAMINATION: Primary | ICD-10-CM

## 2024-01-25 DIAGNOSIS — J43.2 CENTRILOBULAR EMPHYSEMA (HCC): ICD-10-CM

## 2024-01-25 DIAGNOSIS — Z87.891 HISTORY OF NICOTINE DEPENDENCE: ICD-10-CM

## 2024-01-25 DIAGNOSIS — F39 MOOD DISORDER (HCC): ICD-10-CM

## 2024-01-25 DIAGNOSIS — Z87.891 PERSONAL HISTORY OF TOBACCO USE: ICD-10-CM

## 2024-01-25 DIAGNOSIS — I10 BENIGN ESSENTIAL HYPERTENSION: ICD-10-CM

## 2024-01-25 DIAGNOSIS — E55.9 VITAMIN D DEFICIENCY: ICD-10-CM

## 2024-01-25 PROBLEM — Z87.39 HISTORY OF INFECTION OF TOTAL JOINT PROSTHESIS OF KNEE: Status: ACTIVE | Noted: 2024-01-19

## 2024-01-25 RX ORDER — ASPIRIN 81 MG/1
162 TABLET, CHEWABLE ORAL DAILY
COMMUNITY
Start: 2021-06-22

## 2024-01-25 RX ORDER — VITAMIN B COMPLEX
2000 TABLET ORAL
COMMUNITY
Start: 2017-06-29

## 2024-01-25 RX ORDER — BUPROPION HYDROCHLORIDE 300 MG/1
300 TABLET ORAL EVERY MORNING
Qty: 90 TABLET | Refills: 3 | Status: SHIPPED | OUTPATIENT
Start: 2024-01-25

## 2024-01-25 RX ORDER — CELECOXIB 100 MG/1
100 CAPSULE ORAL 2 TIMES DAILY
Qty: 180 CAPSULE | Refills: 1 | Status: SHIPPED | OUTPATIENT
Start: 2024-01-25

## 2024-01-25 RX ORDER — ATORVASTATIN CALCIUM 80 MG/1
80 TABLET, FILM COATED ORAL DAILY
Qty: 90 TABLET | Refills: 1 | Status: SHIPPED | OUTPATIENT
Start: 2024-01-25

## 2024-01-25 RX ORDER — TRAZODONE HYDROCHLORIDE 50 MG/1
TABLET ORAL
Qty: 90 TABLET | Refills: 1 | Status: SHIPPED | OUTPATIENT
Start: 2024-01-25

## 2024-01-25 RX ORDER — NALTREXONE HYDROCHLORIDE 50 MG/1
50 TABLET, FILM COATED ORAL DAILY
Qty: 90 TABLET | Refills: 1 | Status: SHIPPED | OUTPATIENT
Start: 2024-01-25

## 2024-01-25 RX ORDER — CITALOPRAM 20 MG/1
20 TABLET ORAL DAILY
Qty: 90 TABLET | Refills: 1 | Status: SHIPPED | OUTPATIENT
Start: 2024-01-25

## 2024-01-25 RX ORDER — LISINOPRIL AND HYDROCHLOROTHIAZIDE 20; 12.5 MG/1; MG/1
1 TABLET ORAL DAILY
Qty: 90 TABLET | Refills: 1 | Status: SHIPPED | OUTPATIENT
Start: 2024-01-25

## 2024-01-25 RX ORDER — FUROSEMIDE 20 MG/1
TABLET ORAL
Qty: 90 TABLET | Refills: 1 | Status: SHIPPED | OUTPATIENT
Start: 2024-01-25

## 2024-01-25 RX ORDER — SEMAGLUTIDE 1.34 MG/ML
INJECTION, SOLUTION SUBCUTANEOUS
COMMUNITY
Start: 2023-12-22 | End: 2024-01-25

## 2024-01-25 ASSESSMENT — PATIENT HEALTH QUESTIONNAIRE - PHQ9
1. LITTLE INTEREST OR PLEASURE IN DOING THINGS: 0
SUM OF ALL RESPONSES TO PHQ QUESTIONS 1-9: 0
2. FEELING DOWN, DEPRESSED OR HOPELESS: 0
SUM OF ALL RESPONSES TO PHQ QUESTIONS 1-9: 0
SUM OF ALL RESPONSES TO PHQ9 QUESTIONS 1 & 2: 0
SUM OF ALL RESPONSES TO PHQ QUESTIONS 1-9: 0
SUM OF ALL RESPONSES TO PHQ QUESTIONS 1-9: 0

## 2024-01-25 NOTE — PATIENT INSTRUCTIONS
Stop celebrex 7 days before surgery unless advised otherwise by Ortho surgery  Continue ASA unless advised otherwise by ortho surgery  Stop suppments/herbals   Stop naltrexone 7 days prior and don't restart until off of any opioid pain medicines  ASK about metoprolol at your PAT visit      Learning About Lung Cancer Screening  What is screening for lung cancer?     Lung cancer screening is a way to find some lung cancers early, before a person has any symptoms of the cancer.  Lung cancer screening may help those who have the highest risk for lung cancer--people age 50 and older who are or were heavy smokers. For most people, who aren't at increased risk, screening for lung cancer probably isn't helpful.  Screening won't prevent cancer. And it may not find all lung cancers. Lung cancer screening may lower the risk of dying from lung cancer in a small number of people.  How is it done?  Lung cancer screening is done with a low-dose CT (computed tomography) scan. A CT scan uses X-rays, or radiation, to make detailed pictures of your body. Experts recommend that screening be done in medical centers that focus on finding and treating lung cancer.  Who is screening recommended for?  Lung cancer screening is recommended for people age 50 and older who are or were heavy smokers. That means people with a smoking history of at least 20 pack years. A pack year is a way to measure how heavy a smoker you are or were.  To figure out your pack years, multiply how many packs a day on average (assuming 20 cigarettes per pack) you have smoked by how many years you have smoked. For example:  If you smoked 1 pack a day for 20 years, that's 1 times 20. So you have a smoking history of 20 pack years.  If you smoked 2 packs a day for 10 years, that's 2 times 10. So you have a smoking history of 20 pack years.  Experts agree that screening is for people who have a high risk of lung cancer. But experts don't agree on what high risk means.

## 2024-01-25 NOTE — PROGRESS NOTES
Maryann Gallagher is a 58 y.o. female with history of CAD/MI with 2 stents ([facility] in 2010), prediabetes, HLD, HTN, mood disorder, OA, and hx post op DVT,who presents for preop physical exam prior to LEFT TKA on 2/13/2024 under [facility] at [facility] with Bam Mcneil.    Chief Complaint   Patient presents with    Pre-op Exam     Patient stated she is having a Left Total Knee Replacement performed on 2/13/2024 by Dr.Jason Mcneil       HPI    Patient with hx left TKA c/b infection and requiring antibiotic impregnated spacer (2021)  Since then, has progressively worsening pain with walking and weightbearing.  Planning TKA with Dr Bam Mcneil on 2/13/2024.    She follows with Dr Stover for CAD s/p angioplastiy with stent, aortic valve calcification  She will see cardiology for preop clearance this week  She will have preop w/u at PAT at [facility] on 2/5/2024.    Results for Revised Cardiac Risk Index (Mark Criteria) by [employer]  Estimated Risk of Adverse Outcome with Non-cardiac Surgery: Low Risk  Estimated Rate of Myocardial Infarction, Pulmonary Edema, Ventricular Fibrillation, Cardiac Arrest, or Complete Heart Block: 0.9 %  January 25, 2024 at 9:44  Calculated at: [email or web address]  Get Calculate by [employer] for iOS, Android and web at [email or web address]    Medication Recommendations (Unless advised otherwise by ortho)  Stop celebrex >=7 days before surgery  Continue ASA unless advised otherwise by ortho surgery  Stop suppments/herbals now  Stop naltrexone (used for wt loss) 7 days prior to surgery and do not restart until completion of opioid pain medicines  ASK about metoprolol at your cardiac clearance visit with Dr Stover and at your PAT visit       Preoperative documentation     She  denies recent fever, chills, or other symptoms of illness.    She  denies current or recent cardiovascular symptoms such as chest pain, shortness of breath, palpitations, tachycardia, edema, 
advanced directive  - add't info provided, reviewed DNR/DNI and patient is not interested  Offered [facility] [place] ACP-Facilitator appointment no    Patient is accompanied by self I have received verbal consent from Maryann Gallagher to discuss any/all medical information while they are present in the room.   
No indicators present

## 2024-02-05 ENCOUNTER — HOSPITAL ENCOUNTER (OUTPATIENT)
Facility: HOSPITAL | Age: 59
Discharge: HOME | End: 2024-02-08
Payer: MEDICARE

## 2024-02-05 VITALS
TEMPERATURE: 97.7 F | WEIGHT: 222 LBS | HEIGHT: 67 IN | SYSTOLIC BLOOD PRESSURE: 119 MMHG | HEART RATE: 52 BPM | DIASTOLIC BLOOD PRESSURE: 72 MMHG | BODY MASS INDEX: 34.84 KG/M2

## 2024-02-05 LAB
ANION GAP SERPL CALC-SCNC: 1 MMOL/L (ref 5–15)
APPEARANCE UR: CLEAR
BACTERIA URNS QL MICRO: ABNORMAL /HPF
BILIRUB UR QL: NEGATIVE
BUN SERPL-MCNC: 17 MG/DL (ref 6–20)
BUN/CREAT SERPL: 18 (ref 12–20)
CALCIUM SERPL-MCNC: 9.4 MG/DL (ref 8.5–10.1)
CHLORIDE SERPL-SCNC: 99 MMOL/L (ref 97–108)
CO2 SERPL-SCNC: 34 MMOL/L (ref 21–32)
COLOR UR: ABNORMAL
CREAT SERPL-MCNC: 0.97 MG/DL (ref 0.55–1.02)
EPITH CASTS URNS QL MICRO: ABNORMAL /LPF
ERYTHROCYTE [DISTWIDTH] IN BLOOD BY AUTOMATED COUNT: 13.2 % (ref 11.5–14.5)
EST. AVERAGE GLUCOSE BLD GHB EST-MCNC: 94 MG/DL
GLUCOSE SERPL-MCNC: 88 MG/DL (ref 65–100)
GLUCOSE UR STRIP.AUTO-MCNC: NEGATIVE MG/DL
HBA1C MFR BLD: 4.9 % (ref 4–5.6)
HCT VFR BLD AUTO: 41.6 % (ref 35–47)
HGB BLD-MCNC: 13.5 G/DL (ref 11.5–16)
HGB UR QL STRIP: NEGATIVE
INR PPP: 1 (ref 0.9–1.1)
KETONES UR QL STRIP.AUTO: NEGATIVE MG/DL
LEUKOCYTE ESTERASE UR QL STRIP.AUTO: ABNORMAL
MCH RBC QN AUTO: 28.7 PG (ref 26–34)
MCHC RBC AUTO-ENTMCNC: 32.5 G/DL (ref 30–36.5)
MCV RBC AUTO: 88.5 FL (ref 80–99)
NITRITE UR QL STRIP.AUTO: NEGATIVE
NRBC # BLD: 0 K/UL (ref 0–0.01)
NRBC BLD-RTO: 0 PER 100 WBC
PH UR STRIP: 6 (ref 5–8)
PLATELET # BLD AUTO: 257 K/UL (ref 150–400)
PMV BLD AUTO: 12 FL (ref 8.9–12.9)
POTASSIUM SERPL-SCNC: 4.3 MMOL/L (ref 3.5–5.1)
PROT UR STRIP-MCNC: NEGATIVE MG/DL
PROTHROMBIN TIME: 10.3 SEC (ref 9–11.1)
RBC # BLD AUTO: 4.7 M/UL (ref 3.8–5.2)
RBC #/AREA URNS HPF: ABNORMAL /HPF (ref 0–5)
SODIUM SERPL-SCNC: 134 MMOL/L (ref 136–145)
SP GR UR REFRACTOMETRY: 1.01 (ref 1–1.03)
URINE CULTURE IF INDICATED: ABNORMAL
UROBILINOGEN UR QL STRIP.AUTO: 0.2 EU/DL (ref 0.2–1)
WBC # BLD AUTO: 8.2 K/UL (ref 3.6–11)
WBC URNS QL MICRO: ABNORMAL /HPF (ref 0–4)

## 2024-02-05 PROCEDURE — 86850 RBC ANTIBODY SCREEN: CPT

## 2024-02-05 PROCEDURE — 85027 COMPLETE CBC AUTOMATED: CPT

## 2024-02-05 PROCEDURE — 86901 BLOOD TYPING SEROLOGIC RH(D): CPT

## 2024-02-05 PROCEDURE — 85610 PROTHROMBIN TIME: CPT

## 2024-02-05 PROCEDURE — 83036 HEMOGLOBIN GLYCOSYLATED A1C: CPT

## 2024-02-05 PROCEDURE — 36415 COLL VENOUS BLD VENIPUNCTURE: CPT

## 2024-02-05 PROCEDURE — 81001 URINALYSIS AUTO W/SCOPE: CPT

## 2024-02-05 PROCEDURE — APPNB30 APP NON BILLABLE TIME 0-30 MINS: Performed by: NURSE PRACTITIONER

## 2024-02-05 PROCEDURE — 86900 BLOOD TYPING SEROLOGIC ABO: CPT

## 2024-02-05 PROCEDURE — 80048 BASIC METABOLIC PNL TOTAL CA: CPT

## 2024-02-05 RX ORDER — LANOLIN ALCOHOL/MO/W.PET/CERES
1000 CREAM (GRAM) TOPICAL DAILY
COMMUNITY

## 2024-02-05 ASSESSMENT — PAIN SCALES - GENERAL: PAINLEVEL_OUTOF10: 7

## 2024-02-05 ASSESSMENT — PAIN DESCRIPTION - DESCRIPTORS: DESCRIPTORS: ACHING

## 2024-02-05 ASSESSMENT — PAIN DESCRIPTION - LOCATION: LOCATION: KNEE

## 2024-02-05 ASSESSMENT — PAIN DESCRIPTION - ORIENTATION: ORIENTATION: LEFT

## 2024-02-05 NOTE — PERIOP NOTE
[facility] PREOPERATIVE INSTRUCTIONS  ORTHOPAEDIC    Surgery Date:   02/13/2024    Your surgeon's office or [facility] staff will call you between 4 PM- 8 PM the day before surgery with your arrival time.  If your surgery is on a Monday, you will receive a call the preceding Friday. If your surgeon's office has given you, your arrival time then go by that time.    Please report to [facility] Patient Access/Admitting on the 1st floor.  Bring your insurance card, photo identification, and any copayment (if applicable).   If you are going home the same day of your surgery, you must have a responsible adult to drive you home. You need to have a responsible adult to stay with you the first 24 hours after surgery and you should not drive a car for 24 hours following your surgery.  If you are being admitted to the hospital,please leave personal belongings/luggage in your car until you have an assigned hospital room number.  Please wear comfortable clothes. Wear your glasses instead of contacts. We ask that all money, jewelry and valuables be left at home. Wear no make up, particularly mascara, the day of surgery.  Do NOT drink alcohol or smoke 24 hours before surgery. STOP smoking for 14 days prior as it helps with breathing and healing after surgery.   All body piercings, rings, and jewelry need to be removed and left at home. Do not wear any fingernail polish except for clear. Please wear your hair loose or down. Please no pony-tails, buns, or any metal hair accessories. You may wear deodorant. Do not shave any body area within 24 hours of your surgery.  Please follow all instructions to avoid any potential surgical cancellation.  Should your physical condition change, (i.e. fever, cold, flu, etc.) please notify your surgeon as soon as possible.  It is important to be on time. If a situation occurs where you may be delayed, please call:  [phone] / [phone] on the day of surgery.  The Preadmission

## 2024-02-06 LAB
ABO + RH BLD: NORMAL
BACTERIA SPEC CULT: NORMAL
BACTERIA SPEC CULT: NORMAL
BLOOD GROUP ANTIBODIES SERPL: NORMAL
SERVICE CMNT-IMP: NORMAL
SPECIMEN EXP DATE BLD: NORMAL

## 2024-02-08 PROBLEM — Z01.818 ENCOUNTER FOR PREADMISSION TESTING: Status: ACTIVE | Noted: 2024-02-08

## 2024-02-08 NOTE — PROGRESS NOTES
PAT Nurse Practitioner   Pre-Operative Chart Review/Assessment:-ORTHOPEDIC                Patient Name:  Maryann Gallagher                                                           Age:   58 y.o.    :  1965     Today's Date:  2024     Date of PAT:   24      Date of Surgery:    24      Procedure(s):  Left Total Knee Arthroplasty REVISION     Anesthesia:  Spinal     Surgeon:   Dr. Mcneil                       PLAN:      1)  PCP:  Stephany Zuñiga NP      2)  Cardiac Clearance:  Pt followed by Dr. Stover([facility]). LOV 23. Clearance obtained 24. Notes and testing in CE. Clearance letter and EKG scanned in Epic.       3)  Diabetic Treatment Consult:  Not indicated. A1c-4.9      4)  Sleep Apnea evaluation:   Not indicated. TARUN Score 2.       5) Treatment for MRSA/Staph Aureus:  Neg      6) Discharge Plan:  Home      7) Additional Concerns:  Former smoker, HTN, MI/CAD s/p stents x 2, Emphysema, hx of post-op DVT      8) Special Med Recs:  Take BB DOS      Additional Orders for Day of Surgery:  none                Vital Signs:        Vitals:    24 0825   BP: 119/72   Pulse: 52   Temp: 97.7 °F (36.5 °C)             Body mass index is 34.77 kg/m².         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

## 2024-02-09 NOTE — DISCHARGE INSTRUCTIONS
Post-op Discharge Instructions Following Total Joint Replacement  Bam Mcneil MD  [facility]  [phone]  Follow-up Office Visit  See Dr. Mcneil approximately 3-4 weeks from date of surgery. Call [phone] to make an appointment.  If you have any postop questions for Dr. Mcneil's clinical team, please call [phone].  Activity  Use your walker for ambulation.  Weight bearing as tolerated unless instructed otherwise by the physical therapist. Get up every hour you are awake and take a brief walk. Lengthen walking distance daily as your strength improves.  Continue using your walker until seen in the office for your first follow up visit.  Practice your exercises 3 times daily as instructed by the physical therapist. Ice for 20 minutes after exercising.  No driving until seen in the office for your first follow up visit.  Incision Care  The light brown Aquacel surgical dressing is waterproof and is to remain on your incision for 7 days. On the 7th day, carefully lift the edge of the dressing to break the adhesive seal and gently peel it off.  If your Aquacel dressings comes loose or falls off before the 7th day, replace it with a dry sterile gauze dressing and change this dressing daily. Once there is no drainage on the bandage, you mean leave the incision open to air.  You may take a shower with the Aquacel dressing in place. After you remove the Aquacel dressing on day 7, you may continue to shower and get your incision wet in the shower. Do not submerge your incision under water in a bathtub, hot tub, swimming pool, etc. until after you have been evaluated at your first office visit.  Medications  Blood Clot Prevention: Take medication as prescribed by your physician for 4 weeks postop.  Pain Management: Take pain medication as prescribed; wean yourself off of pain medication as your pain lessens. Take with food.  You make also take Tylenol every 4-6 hours as needed for pain.  Do not exceed 3

## 2024-02-12 ENCOUNTER — ANESTHESIA EVENT (OUTPATIENT)
Facility: HOSPITAL | Age: 59
DRG: 468 | End: 2024-02-12
Payer: MEDICARE

## 2024-02-13 ENCOUNTER — APPOINTMENT (OUTPATIENT)
Facility: HOSPITAL | Age: 59
DRG: 468 | End: 2024-02-13
Attending: ORTHOPAEDIC SURGERY
Payer: MEDICARE

## 2024-02-13 ENCOUNTER — HOSPITAL ENCOUNTER (INPATIENT)
Facility: HOSPITAL | Age: 59
LOS: 1 days | Discharge: HOME HEALTH | DRG: 468 | End: 2024-02-14
Attending: ORTHOPAEDIC SURGERY | Admitting: ORTHOPAEDIC SURGERY
Payer: MEDICARE

## 2024-02-13 ENCOUNTER — ANESTHESIA (OUTPATIENT)
Facility: HOSPITAL | Age: 59
DRG: 468 | End: 2024-02-13
Payer: MEDICARE

## 2024-02-13 DIAGNOSIS — R73.03 PREDIABETES: ICD-10-CM

## 2024-02-13 DIAGNOSIS — Z96.652 S/P REVISION OF TOTAL KNEE, LEFT: Primary | ICD-10-CM

## 2024-02-13 DIAGNOSIS — E66.01 SEVERE OBESITY (BMI 35.0-35.9 WITH COMORBIDITY) (HCC): ICD-10-CM

## 2024-02-13 DIAGNOSIS — E78.2 MIXED HYPERLIPIDEMIA: ICD-10-CM

## 2024-02-13 PROBLEM — T84.54XS INFECTION OF TOTAL LEFT KNEE REPLACEMENT, SEQUELA: Status: ACTIVE | Noted: 2024-02-13

## 2024-02-13 LAB
GLUCOSE BLD STRIP.AUTO-MCNC: 86 MG/DL (ref 65–117)
SERVICE CMNT-IMP: NORMAL

## 2024-02-13 PROCEDURE — 2580000003 HC RX 258: Performed by: PHYSICIAN ASSISTANT

## 2024-02-13 PROCEDURE — 6360000002 HC RX W HCPCS: Performed by: STUDENT IN AN ORGANIZED HEALTH CARE EDUCATION/TRAINING PROGRAM

## 2024-02-13 PROCEDURE — 82962 GLUCOSE BLOOD TEST: CPT

## 2024-02-13 PROCEDURE — 3700000001 HC ADD 15 MINUTES (ANESTHESIA): Performed by: ORTHOPAEDIC SURGERY

## 2024-02-13 PROCEDURE — 0SPD0EZ REMOVAL OF ARTICULATING SPACER FROM LEFT KNEE JOINT, OPEN APPROACH: ICD-10-PCS | Performed by: ORTHOPAEDIC SURGERY

## 2024-02-13 PROCEDURE — 2500000003 HC RX 250 WO HCPCS: Performed by: NURSE ANESTHETIST, CERTIFIED REGISTERED

## 2024-02-13 PROCEDURE — 7100000000 HC PACU RECOVERY - FIRST 15 MIN: Performed by: ORTHOPAEDIC SURGERY

## 2024-02-13 PROCEDURE — 6360000002 HC RX W HCPCS: Performed by: NURSE ANESTHETIST, CERTIFIED REGISTERED

## 2024-02-13 PROCEDURE — 7100000001 HC PACU RECOVERY - ADDTL 15 MIN: Performed by: ORTHOPAEDIC SURGERY

## 2024-02-13 PROCEDURE — C1776 JOINT DEVICE (IMPLANTABLE): HCPCS | Performed by: ORTHOPAEDIC SURGERY

## 2024-02-13 PROCEDURE — 64447 NJX AA&/STRD FEMORAL NRV IMG: CPT | Performed by: ANESTHESIOLOGY

## 2024-02-13 PROCEDURE — 88305 TISSUE EXAM BY PATHOLOGIST: CPT

## 2024-02-13 PROCEDURE — 2580000003 HC RX 258: Performed by: STUDENT IN AN ORGANIZED HEALTH CARE EDUCATION/TRAINING PROGRAM

## 2024-02-13 PROCEDURE — 73560 X-RAY EXAM OF KNEE 1 OR 2: CPT

## 2024-02-13 PROCEDURE — 88331 PATH CONSLTJ SURG 1 BLK 1SPC: CPT

## 2024-02-13 PROCEDURE — 87102 FUNGUS ISOLATION CULTURE: CPT

## 2024-02-13 PROCEDURE — 2709999900 HC NON-CHARGEABLE SUPPLY: Performed by: ORTHOPAEDIC SURGERY

## 2024-02-13 PROCEDURE — 6370000000 HC RX 637 (ALT 250 FOR IP): Performed by: ORTHOPAEDIC SURGERY

## 2024-02-13 PROCEDURE — 1100000000 HC RM PRIVATE

## 2024-02-13 PROCEDURE — 6360000002 HC RX W HCPCS: Performed by: PHYSICIAN ASSISTANT

## 2024-02-13 PROCEDURE — 87075 CULTR BACTERIA EXCEPT BLOOD: CPT

## 2024-02-13 PROCEDURE — C1713 ANCHOR/SCREW BN/BN,TIS/BN: HCPCS | Performed by: ORTHOPAEDIC SURGERY

## 2024-02-13 PROCEDURE — 3700000000 HC ANESTHESIA ATTENDED CARE: Performed by: ORTHOPAEDIC SURGERY

## 2024-02-13 PROCEDURE — 2580000003 HC RX 258: Performed by: NURSE ANESTHETIST, CERTIFIED REGISTERED

## 2024-02-13 PROCEDURE — 6370000000 HC RX 637 (ALT 250 FOR IP): Performed by: PHYSICIAN ASSISTANT

## 2024-02-13 PROCEDURE — 87070 CULTURE OTHR SPECIMN AEROBIC: CPT

## 2024-02-13 PROCEDURE — 3600000005 HC SURGERY LEVEL 5 BASE: Performed by: ORTHOPAEDIC SURGERY

## 2024-02-13 PROCEDURE — 0SRD0J9 REPLACEMENT OF LEFT KNEE JOINT WITH SYNTHETIC SUBSTITUTE, CEMENTED, OPEN APPROACH: ICD-10-PCS | Performed by: ORTHOPAEDIC SURGERY

## 2024-02-13 PROCEDURE — 87205 SMEAR GRAM STAIN: CPT

## 2024-02-13 PROCEDURE — 3600000015 HC SURGERY LEVEL 5 ADDTL 15MIN: Performed by: ORTHOPAEDIC SURGERY

## 2024-02-13 PROCEDURE — 2500000003 HC RX 250 WO HCPCS: Performed by: PHYSICIAN ASSISTANT

## 2024-02-13 RX ORDER — HYDRALAZINE HYDROCHLORIDE 20 MG/ML
10 INJECTION INTRAMUSCULAR; INTRAVENOUS
Status: DISCONTINUED | OUTPATIENT
Start: 2024-02-13 | End: 2024-02-13 | Stop reason: HOSPADM

## 2024-02-13 RX ORDER — MIDAZOLAM HYDROCHLORIDE 2 MG/2ML
2 INJECTION, SOLUTION INTRAMUSCULAR; INTRAVENOUS
Status: COMPLETED | OUTPATIENT
Start: 2024-02-13 | End: 2024-02-13

## 2024-02-13 RX ORDER — SODIUM CHLORIDE 0.9 % (FLUSH) 0.9 %
5-40 SYRINGE (ML) INJECTION PRN
Status: DISCONTINUED | OUTPATIENT
Start: 2024-02-13 | End: 2024-02-13 | Stop reason: HOSPADM

## 2024-02-13 RX ORDER — PROPOFOL 10 MG/ML
INJECTION, EMULSION INTRAVENOUS CONTINUOUS PRN
Status: DISCONTINUED | OUTPATIENT
Start: 2024-02-13 | End: 2024-02-13 | Stop reason: SDUPTHER

## 2024-02-13 RX ORDER — SODIUM CHLORIDE, SODIUM LACTATE, POTASSIUM CHLORIDE, CALCIUM CHLORIDE 600; 310; 30; 20 MG/100ML; MG/100ML; MG/100ML; MG/100ML
INJECTION, SOLUTION INTRAVENOUS CONTINUOUS
Status: DISCONTINUED | OUTPATIENT
Start: 2024-02-13 | End: 2024-02-13 | Stop reason: HOSPADM

## 2024-02-13 RX ORDER — LIDOCAINE HYDROCHLORIDE 20 MG/ML
INJECTION, SOLUTION EPIDURAL; INFILTRATION; INTRACAUDAL; PERINEURAL PRN
Status: DISCONTINUED | OUTPATIENT
Start: 2024-02-13 | End: 2024-02-13 | Stop reason: SDUPTHER

## 2024-02-13 RX ORDER — SENNA AND DOCUSATE SODIUM 50; 8.6 MG/1; MG/1
1 TABLET, FILM COATED ORAL 2 TIMES DAILY
Status: DISCONTINUED | OUTPATIENT
Start: 2024-02-13 | End: 2024-02-14 | Stop reason: HOSPADM

## 2024-02-13 RX ORDER — HYDROMORPHONE HYDROCHLORIDE 1 MG/ML
0.5 INJECTION, SOLUTION INTRAMUSCULAR; INTRAVENOUS; SUBCUTANEOUS EVERY 5 MIN PRN
Status: DISCONTINUED | OUTPATIENT
Start: 2024-02-13 | End: 2024-02-13 | Stop reason: HOSPADM

## 2024-02-13 RX ORDER — ROPIVACAINE HYDROCHLORIDE 5 MG/ML
INJECTION, SOLUTION EPIDURAL; INFILTRATION; PERINEURAL
Status: COMPLETED | OUTPATIENT
Start: 2024-02-13 | End: 2024-02-13

## 2024-02-13 RX ORDER — ONDANSETRON 4 MG/1
4 TABLET, ORALLY DISINTEGRATING ORAL EVERY 8 HOURS PRN
Status: DISCONTINUED | OUTPATIENT
Start: 2024-02-13 | End: 2024-02-14 | Stop reason: HOSPADM

## 2024-02-13 RX ORDER — BUPIVACAINE HYDROCHLORIDE 5 MG/ML
INJECTION, SOLUTION EPIDURAL; INTRACAUDAL
Status: COMPLETED | OUTPATIENT
Start: 2024-02-13 | End: 2024-02-13

## 2024-02-13 RX ORDER — ACETAMINOPHEN 500 MG
1000 TABLET ORAL ONCE
Status: DISCONTINUED | OUTPATIENT
Start: 2024-02-13 | End: 2024-02-13 | Stop reason: SDUPTHER

## 2024-02-13 RX ORDER — BISACODYL 10 MG
10 SUPPOSITORY, RECTAL RECTAL DAILY PRN
Status: DISCONTINUED | OUTPATIENT
Start: 2024-02-13 | End: 2024-02-14 | Stop reason: HOSPADM

## 2024-02-13 RX ORDER — SODIUM CHLORIDE 0.9 % (FLUSH) 0.9 %
5-40 SYRINGE (ML) INJECTION EVERY 12 HOURS SCHEDULED
Status: DISCONTINUED | OUTPATIENT
Start: 2024-02-13 | End: 2024-02-13 | Stop reason: HOSPADM

## 2024-02-13 RX ORDER — ACETAMINOPHEN 500 MG
1000 TABLET ORAL ONCE
Status: DISCONTINUED | OUTPATIENT
Start: 2024-02-13 | End: 2024-02-13 | Stop reason: HOSPADM

## 2024-02-13 RX ORDER — MIDAZOLAM HYDROCHLORIDE 1 MG/ML
INJECTION INTRAMUSCULAR; INTRAVENOUS PRN
Status: DISCONTINUED | OUTPATIENT
Start: 2024-02-13 | End: 2024-02-13 | Stop reason: SDUPTHER

## 2024-02-13 RX ORDER — LISINOPRIL 10 MG/1
20 TABLET ORAL DAILY
Status: DISCONTINUED | OUTPATIENT
Start: 2024-02-14 | End: 2024-02-14 | Stop reason: HOSPADM

## 2024-02-13 RX ORDER — SODIUM CHLORIDE 9 MG/ML
INJECTION, SOLUTION INTRAVENOUS PRN
Status: DISCONTINUED | OUTPATIENT
Start: 2024-02-13 | End: 2024-02-14 | Stop reason: HOSPADM

## 2024-02-13 RX ORDER — OXYCODONE HYDROCHLORIDE 5 MG/1
2.5 TABLET ORAL
Status: DISCONTINUED | OUTPATIENT
Start: 2024-02-13 | End: 2024-02-14 | Stop reason: HOSPADM

## 2024-02-13 RX ORDER — HYDROXYZINE HYDROCHLORIDE 10 MG/1
10 TABLET, FILM COATED ORAL EVERY 8 HOURS PRN
Status: DISCONTINUED | OUTPATIENT
Start: 2024-02-13 | End: 2024-02-14 | Stop reason: HOSPADM

## 2024-02-13 RX ORDER — ONDANSETRON 2 MG/ML
4 INJECTION INTRAMUSCULAR; INTRAVENOUS EVERY 6 HOURS PRN
Status: DISCONTINUED | OUTPATIENT
Start: 2024-02-13 | End: 2024-02-14 | Stop reason: HOSPADM

## 2024-02-13 RX ORDER — LISINOPRIL AND HYDROCHLOROTHIAZIDE 20; 12.5 MG/1; MG/1
1 TABLET ORAL EVERY MORNING
Status: DISCONTINUED | OUTPATIENT
Start: 2024-02-14 | End: 2024-02-13 | Stop reason: SDUPTHER

## 2024-02-13 RX ORDER — PROCHLORPERAZINE EDISYLATE 5 MG/ML
5 INJECTION INTRAMUSCULAR; INTRAVENOUS
Status: DISCONTINUED | OUTPATIENT
Start: 2024-02-13 | End: 2024-02-13 | Stop reason: HOSPADM

## 2024-02-13 RX ORDER — SODIUM CHLORIDE 0.9 % (FLUSH) 0.9 %
5-40 SYRINGE (ML) INJECTION EVERY 12 HOURS SCHEDULED
Status: DISCONTINUED | OUTPATIENT
Start: 2024-02-13 | End: 2024-02-14 | Stop reason: HOSPADM

## 2024-02-13 RX ORDER — SODIUM CHLORIDE 9 MG/ML
INJECTION, SOLUTION INTRAVENOUS CONTINUOUS
Status: DISCONTINUED | OUTPATIENT
Start: 2024-02-13 | End: 2024-02-14 | Stop reason: HOSPADM

## 2024-02-13 RX ORDER — SODIUM CHLORIDE 9 MG/ML
INJECTION, SOLUTION INTRAVENOUS PRN
Status: DISCONTINUED | OUTPATIENT
Start: 2024-02-13 | End: 2024-02-13 | Stop reason: HOSPADM

## 2024-02-13 RX ORDER — OXYCODONE HYDROCHLORIDE 5 MG/1
5 TABLET ORAL
Status: DISCONTINUED | OUTPATIENT
Start: 2024-02-13 | End: 2024-02-13 | Stop reason: HOSPADM

## 2024-02-13 RX ORDER — FENTANYL CITRATE 50 UG/ML
25 INJECTION, SOLUTION INTRAMUSCULAR; INTRAVENOUS EVERY 5 MIN PRN
Status: DISCONTINUED | OUTPATIENT
Start: 2024-02-13 | End: 2024-02-13 | Stop reason: HOSPADM

## 2024-02-13 RX ORDER — HYDROMORPHONE HYDROCHLORIDE 1 MG/ML
0.5 INJECTION, SOLUTION INTRAMUSCULAR; INTRAVENOUS; SUBCUTANEOUS EVERY 4 HOURS PRN
Status: DISCONTINUED | OUTPATIENT
Start: 2024-02-13 | End: 2024-02-14 | Stop reason: HOSPADM

## 2024-02-13 RX ORDER — ACETAMINOPHEN 500 MG
500 TABLET ORAL
Status: DISCONTINUED | OUTPATIENT
Start: 2024-02-13 | End: 2024-02-14 | Stop reason: HOSPADM

## 2024-02-13 RX ORDER — OXYCODONE HYDROCHLORIDE 5 MG/1
5 TABLET ORAL
Status: DISCONTINUED | OUTPATIENT
Start: 2024-02-13 | End: 2024-02-14 | Stop reason: HOSPADM

## 2024-02-13 RX ORDER — ONDANSETRON 2 MG/ML
INJECTION INTRAMUSCULAR; INTRAVENOUS PRN
Status: DISCONTINUED | OUTPATIENT
Start: 2024-02-13 | End: 2024-02-13 | Stop reason: SDUPTHER

## 2024-02-13 RX ORDER — NALTREXONE HYDROCHLORIDE 50 MG/1
50 TABLET, FILM COATED ORAL EVERY MORNING
Status: DISCONTINUED | OUTPATIENT
Start: 2024-02-14 | End: 2024-02-14 | Stop reason: HOSPADM

## 2024-02-13 RX ORDER — FENTANYL CITRATE 50 UG/ML
100 INJECTION, SOLUTION INTRAMUSCULAR; INTRAVENOUS
Status: COMPLETED | OUTPATIENT
Start: 2024-02-13 | End: 2024-02-13

## 2024-02-13 RX ORDER — TRAZODONE HYDROCHLORIDE 100 MG/1
50 TABLET ORAL NIGHTLY
Status: DISCONTINUED | OUTPATIENT
Start: 2024-02-13 | End: 2024-02-14 | Stop reason: HOSPADM

## 2024-02-13 RX ORDER — BUPROPION HYDROCHLORIDE 150 MG/1
150 TABLET, EXTENDED RELEASE ORAL 2 TIMES DAILY
Status: DISCONTINUED | OUTPATIENT
Start: 2024-02-14 | End: 2024-02-14 | Stop reason: HOSPADM

## 2024-02-13 RX ORDER — DEXAMETHASONE SODIUM PHOSPHATE 4 MG/ML
INJECTION, SOLUTION INTRA-ARTICULAR; INTRALESIONAL; INTRAMUSCULAR; INTRAVENOUS; SOFT TISSUE PRN
Status: DISCONTINUED | OUTPATIENT
Start: 2024-02-13 | End: 2024-02-13 | Stop reason: SDUPTHER

## 2024-02-13 RX ORDER — CELECOXIB 200 MG/1
200 CAPSULE ORAL ONCE
Status: DISCONTINUED | OUTPATIENT
Start: 2024-02-13 | End: 2024-02-13 | Stop reason: HOSPADM

## 2024-02-13 RX ORDER — 0.9 % SODIUM CHLORIDE 0.9 %
500 INTRAVENOUS SOLUTION INTRAVENOUS PRN
Status: DISCONTINUED | OUTPATIENT
Start: 2024-02-13 | End: 2024-02-14 | Stop reason: HOSPADM

## 2024-02-13 RX ORDER — HYDROCHLOROTHIAZIDE 25 MG/1
12.5 TABLET ORAL DAILY
Status: DISCONTINUED | OUTPATIENT
Start: 2024-02-14 | End: 2024-02-14 | Stop reason: HOSPADM

## 2024-02-13 RX ORDER — CITALOPRAM 20 MG/1
20 TABLET ORAL EVERY MORNING
Status: DISCONTINUED | OUTPATIENT
Start: 2024-02-14 | End: 2024-02-14 | Stop reason: HOSPADM

## 2024-02-13 RX ORDER — ONDANSETRON 2 MG/ML
4 INJECTION INTRAMUSCULAR; INTRAVENOUS
Status: DISCONTINUED | OUTPATIENT
Start: 2024-02-13 | End: 2024-02-13 | Stop reason: HOSPADM

## 2024-02-13 RX ORDER — SODIUM CHLORIDE 0.9 % (FLUSH) 0.9 %
5-40 SYRINGE (ML) INJECTION PRN
Status: DISCONTINUED | OUTPATIENT
Start: 2024-02-13 | End: 2024-02-14 | Stop reason: HOSPADM

## 2024-02-13 RX ORDER — KETOROLAC TROMETHAMINE 30 MG/ML
15 INJECTION, SOLUTION INTRAMUSCULAR; INTRAVENOUS EVERY 6 HOURS
Status: COMPLETED | OUTPATIENT
Start: 2024-02-13 | End: 2024-02-14

## 2024-02-13 RX ORDER — ATORVASTATIN CALCIUM 40 MG/1
80 TABLET, FILM COATED ORAL NIGHTLY
Status: DISCONTINUED | OUTPATIENT
Start: 2024-02-13 | End: 2024-02-14 | Stop reason: HOSPADM

## 2024-02-13 RX ADMIN — MIDAZOLAM 2 MG: 1 INJECTION INTRAMUSCULAR; INTRAVENOUS at 09:58

## 2024-02-13 RX ADMIN — PROPOFOL 40 MG: 10 INJECTION, EMULSION INTRAVENOUS at 11:28

## 2024-02-13 RX ADMIN — OXYCODONE 5 MG: 5 TABLET ORAL at 21:25

## 2024-02-13 RX ADMIN — SODIUM CHLORIDE, POTASSIUM CHLORIDE, SODIUM LACTATE AND CALCIUM CHLORIDE: 600; 310; 30; 20 INJECTION, SOLUTION INTRAVENOUS at 09:26

## 2024-02-13 RX ADMIN — WATER 2000 MG: 1 INJECTION INTRAMUSCULAR; INTRAVENOUS; SUBCUTANEOUS at 11:45

## 2024-02-13 RX ADMIN — MIDAZOLAM 2 MG: 1 INJECTION INTRAMUSCULAR; INTRAVENOUS at 11:23

## 2024-02-13 RX ADMIN — SODIUM CHLORIDE: 9 INJECTION, SOLUTION INTRAVENOUS at 14:40

## 2024-02-13 RX ADMIN — DEXAMETHASONE SODIUM PHOSPHATE 4 MG: 4 INJECTION INTRA-ARTICULAR; INTRALESIONAL; INTRAMUSCULAR; INTRAVENOUS; SOFT TISSUE at 11:44

## 2024-02-13 RX ADMIN — TRANEXAMIC ACID 1000 MG: 100 INJECTION, SOLUTION INTRAVENOUS at 15:20

## 2024-02-13 RX ADMIN — LIDOCAINE HYDROCHLORIDE 80 MG: 20 INJECTION, SOLUTION EPIDURAL; INFILTRATION; INTRACAUDAL; PERINEURAL at 11:28

## 2024-02-13 RX ADMIN — KETOROLAC TROMETHAMINE 15 MG: 30 INJECTION, SOLUTION INTRAMUSCULAR; INTRAVENOUS at 18:43

## 2024-02-13 RX ADMIN — WATER 2000 MG: 1 INJECTION INTRAMUSCULAR; INTRAVENOUS; SUBCUTANEOUS at 21:01

## 2024-02-13 RX ADMIN — ONDANSETRON 4 MG: 2 INJECTION INTRAMUSCULAR; INTRAVENOUS at 11:44

## 2024-02-13 RX ADMIN — SENNOSIDES AND DOCUSATE SODIUM 1 TABLET: 8.6; 5 TABLET ORAL at 21:18

## 2024-02-13 RX ADMIN — PHENYLEPHRINE HYDROCHLORIDE 50 MCG/MIN: 10 INJECTION INTRAVENOUS at 11:37

## 2024-02-13 RX ADMIN — SODIUM CHLORIDE, PRESERVATIVE FREE 10 ML: 5 INJECTION INTRAVENOUS at 21:21

## 2024-02-13 RX ADMIN — PROPOFOL 50 MCG/KG/MIN: 10 INJECTION, EMULSION INTRAVENOUS at 11:28

## 2024-02-13 RX ADMIN — ACETAMINOPHEN 500 MG: 500 TABLET ORAL at 23:08

## 2024-02-13 RX ADMIN — ATORVASTATIN CALCIUM 80 MG: 40 TABLET, FILM COATED ORAL at 21:18

## 2024-02-13 RX ADMIN — BUPIVACAINE HYDROCHLORIDE 12.5 MG: 5 INJECTION, SOLUTION EPIDURAL; INTRACAUDAL; PERINEURAL at 11:35

## 2024-02-13 RX ADMIN — TRAZODONE HYDROCHLORIDE 50 MG: 100 TABLET ORAL at 21:19

## 2024-02-13 RX ADMIN — SODIUM CHLORIDE: 9 INJECTION, SOLUTION INTRAVENOUS at 16:58

## 2024-02-13 RX ADMIN — TRANEXAMIC ACID 1000 MG: 100 INJECTION, SOLUTION INTRAVENOUS at 11:51

## 2024-02-13 RX ADMIN — ACETAMINOPHEN 500 MG: 500 TABLET ORAL at 18:41

## 2024-02-13 RX ADMIN — FENTANYL CITRATE 50 MCG: 50 INJECTION INTRAMUSCULAR; INTRAVENOUS at 09:58

## 2024-02-13 RX ADMIN — ONDANSETRON 4 MG: 2 INJECTION INTRAMUSCULAR; INTRAVENOUS at 14:44

## 2024-02-13 RX ADMIN — ROPIVACAINE HYDROCHLORIDE 30 ML: 5 INJECTION, SOLUTION EPIDURAL; INFILTRATION; PERINEURAL at 09:55

## 2024-02-13 NOTE — ANESTHESIA PROCEDURE NOTES
Peripheral Block    Patient location during procedure: pre-op  Reason for block: post-op pain management and at surgeon's request  Start time: 2/13/2024 9:55 AM  End time: 2/13/2024 10:05 AM  Staffing  Performed: anesthesiologist   Anesthesiologist: Maryann Scott DO  Performed by: Maryann Scott DO  Authorized by: Maryann Scott DO    Preanesthetic Checklist  Completed: patient identified, IV checked, site marked, risks and benefits discussed, surgical/procedural consents, equipment checked, pre-op evaluation, timeout performed, anesthesia consent given, oxygen available, monitors applied/VS acknowledged and fire risk safety assessment completed and verbalized  Peripheral Block   Patient position: supine  Prep: ChloraPrep  Provider prep: mask and sterile gloves  Patient monitoring: cardiac monitor, continuous pulse ox, frequent blood pressure checks, IV access and oxygen  Block type: Saphenous  Laterality: left  Injection technique: single-shot  Guidance: ultrasound guided  Local infiltration: ropivacaine  Infiltration strength: 0.5 %  Local infiltration: ropivacaineDose: 30 mL    Needle   Needle type: insulated echogenic nerve stimulator needle   Needle gauge: 21 G  Needle localization: anatomical landmarks and ultrasound guidance  Needle length: 10 cm  Assessment   Injection assessment: negative aspiration for heme, no paresthesia on injection, local visualized surrounding nerve on ultrasound and no intravascular symptoms  Paresthesia pain: none  Slow fractionated injection: yes  Hemodynamics: stable  Real-time US image taken/store: yes  Outcomes: uncomplicated and patient tolerated procedure well    Medications Administered  ropivacaine (NAROPIN) injection 0.5% - Perineural   30 mL - 2/13/2024 9:55:00 AM

## 2024-02-13 NOTE — BRIEF OP NOTE
Brief Postoperative Note      Patient: Maryann Gallagher  YOB: 1965  MRN: [id number]    Date of Procedure: 2/13/2024    Pre-op Diagnosis:  Previously infected left total knee replacement     Post-Op Diagnosis:  Same       Procedure(s):  REVISION LEFT TOTAL KNEE ARTHROPLASTY    Surgeon(s):  Bam cMneil MD    Assistant:  Surgical Assistant: Vishnu Alcantar    Anesthesia: Spinal    Estimated Blood Loss (mL): 250    Complications: None    Specimens:   ID Type Source Tests Collected by Time Destination   1 : TIBIAL INTERFACE MEMBRANE  Bone Joint, Knee SURGICAL PATHOLOGY, CULTURE, FUNGUS, GRAM STAIN, CULTURE WITH SMEAR, ACID FAST BACILLIUS, SUSCEPT, AER + ANAER REFL Bam Mcneil MD 2/13/2024 1255    A : SYNOVIAL FLUID Swab Joint, Knee CULTURE, FUNGUS, GRAM STAIN, CULTURE, WOUND, SUSCEPT, AER + ANAER REFL Bam Mcneil MD 2/13/2024 1210    B : FEMORAL INTERFACE MEMBRANE Swab Joint, Knee CULTURE, FUNGUS, GRAM STAIN, CULTURE, WOUND, SUSCEPT, AER + ANAER REFL Bam Mcneil MD 2/13/2024 1212    C : TIBIA INTERFACE MEMBRANE  Swab Joint, Knee CULTURE, FUNGUS, GRAM STAIN, CULTURE, WOUND, SUSCEPT, AER + ANAER REFL Bam Mcneil MD 2/13/2024 1213        Implants:  Implant Name Type Inv. Item Serial No.  Lot No. LRB No. Used Action   CEMENT BNE MED VISC 80 GM GENTAMICIN PALACOS MV+G PRO - SN/A  CEMENT BNE MED VISC 80 GM GENTAMICIN PALACOS MV+G PRO N/A [facility] [id number] Left 2 Implanted   AUGMENT TIB SZ A KNEE TRITANIUM SYMMETRICAL CONE REV - SN/A  AUGMENT TIB SZ A KNEE TRITANIUM SYMMETRICAL CONE REV N/A [employer] [facility] [id number] Left 1 Implanted   PLUG BNE MIGUEL L POLYETH FOR 14-17MM IM CNL - SN/A  PLUG BNE MIGUEL L POLYETH FOR 14-17MM IM CNL N/A [employer]- [id number] Left 1 Implanted   PLUG BNE MIGUEL XL POLYETH FOR 18-21MM IM CNL - SN/A  PLUG BNE MIGUEL XL POLYETH FOR 18-21MM IM CNL N/A [employer]- [id number] Left 1 Implanted   SCREW BNE 10 MM TIB AUG EMPOWR

## 2024-02-13 NOTE — ANESTHESIA PROCEDURE NOTES
Spinal Block    Patient location during procedure: OR  End time: 2/13/2024 11:40 AM  Reason for block: primary anesthetic  Staffing  Performed: anesthesiologist   Anesthesiologist: Maryann Scott DO  Performed by: Maryann Scott DO  Authorized by: Maryann Scott DO    Spinal Block  Patient position: sitting  Prep: DuraPrep  Patient monitoring: cardiac monitor, continuous pulse ox, frequent blood pressure checks and oxygen  Approach: midline  Location: L4/L5  Provider prep: mask and sterile gloves  Needle  Needle type: pencil-tip   Needle gauge: 25 G  Needle length: 4 in  Assessment  Sensory level: T4  Events: None  Swirl obtained: Yes  CSF: clear  Attempts: 1  Hemodynamics: stable  Preanesthetic Checklist  Completed: patient identified, IV checked, site marked, risks and benefits discussed, surgical/procedural consents, equipment checked, pre-op evaluation, timeout performed, anesthesia consent given, oxygen available, monitors applied/VS acknowledged and fire risk safety assessment completed and verbalized

## 2024-02-13 NOTE — ANESTHESIA PRE PROCEDURE
Department of Anesthesiology  Preprocedure Note       Name:  Maryann Gallagher   Age:  58 y.o.  :  1965                                          MRN:  [id number]         Date:  2024      Surgeon: Surgeon(s):  Bam Mcneil MD    Procedure: Procedure(s):  REVISION LEFT TOTAL KNEE ARTHROPLASTY  (SPINAL W/ BLOCK)    Medications prior to admission:   Prior to Admission medications    Medication Sig Start Date End Date Taking? Authorizing Provider   vitamin B-12 (CYANOCOBALAMIN) 1000 MCG tablet Take 1 tablet by mouth daily    Richard Schmitt MD   aspirin 81 MG chewable tablet Take 2 tablets by mouth every morning 21   Richard Schmitt MD   Vitamin D (CHOLECALCIFEROL) 25 MCG (1000 UT) TABS tablet Take 2 tablets by mouth 17   Richard Schmitt MD   atorvastatin (LIPITOR) 80 MG tablet Take 1 tablet by mouth daily  Patient taking differently: Take 1 tablet by mouth nightly 24   Stephany Zuñiga APRN - CNP   buPROPion (WELLBUTRIN XL) 300 MG extended release tablet Take 1 tablet by mouth every morning 24   Stephany Zuñiga APRN - CNP   celecoxib (CELEBREX) 100 MG capsule Take 1 capsule by mouth 2 times daily 24   Stephany Zuñiga APRN - CNP   citalopram (CELEXA) 20 MG tablet Take 1 tablet by mouth daily  Patient taking differently: Take 1 tablet by mouth every morning 24   Stephany Zuñiga APRN - CNP   furosemide (LASIX) 20 MG tablet TAKE 1 TABLET ONE TIME DAILY 90  Patient taking differently: Take 1 tablet by mouth every morning TAKE 1 TABLET ONE TIME DAILY 90 24   Stephany Zuñiga APRN - CNP   lisinopril-hydroCHLOROthiazide (PRINZIDE;ZESTORETIC) 20-12.5 MG per tablet Take 1 tablet by mouth daily  Patient taking differently: Take 1 tablet by mouth every morning 24   Stephany Zuñiga APRN - CNP   metoprolol tartrate (LOPRESSOR) 25 MG tablet Take 1 tablet by mouth 2 times daily 24   Stephany Zuñiga APRN - CNP   naltrexone (DEPADE) 50 MG tablet Take 1

## 2024-02-13 NOTE — FLOWSHEET NOTE
02/13/24 1311   Family Communication    Relationship to Patient Daughter    Phone Number Isidra Oconnor [phone]   Family/Significant Other Update Called;Updated   Delivery Origin Nurse   Family Communication   Family Update Message Procedure started;Surgeon working

## 2024-02-13 NOTE — PERIOP NOTE
TRANSFER - OUT REPORT:    Verbal report given to Jacquelin STONE(name) on Maryann Gallagher  being transferred to [facility](unit) for routine post-op       Report consisted of patient’s Situation, Background, Assessment and   Recommendations(SBAR).     Time Pre op antibiotic given:1545  Anesthesia Stop time: 1613    Information from the following report(s) SBAR, Procedure Summary, and MAR was reviewed with the receiving nurse.    Opportunity for questions and clarification was provided.     Is the patient on 02? No    Is the patient on a monitor? No    Is the nurse transporting with the patient? No    Surgical Waiting Area notified of patient's transfer from PACU? Daughter called pacu. Updated.    Belongings transported to room with pt.

## 2024-02-14 VITALS
BODY MASS INDEX: 34.21 KG/M2 | RESPIRATION RATE: 16 BRPM | SYSTOLIC BLOOD PRESSURE: 110 MMHG | OXYGEN SATURATION: 97 % | WEIGHT: 218 LBS | TEMPERATURE: 98.6 F | HEIGHT: 67 IN | HEART RATE: 69 BPM | DIASTOLIC BLOOD PRESSURE: 41 MMHG

## 2024-02-14 LAB
ANION GAP SERPL CALC-SCNC: 4 MMOL/L (ref 5–15)
BACTERIA SPEC CULT: NORMAL
BUN SERPL-MCNC: 24 MG/DL (ref 6–20)
BUN/CREAT SERPL: 27 (ref 12–20)
CALCIUM SERPL-MCNC: 7.8 MG/DL (ref 8.5–10.1)
CHLORIDE SERPL-SCNC: 108 MMOL/L (ref 97–108)
CO2 SERPL-SCNC: 25 MMOL/L (ref 21–32)
CREAT SERPL-MCNC: 0.88 MG/DL (ref 0.55–1.02)
GLUCOSE SERPL-MCNC: 114 MG/DL (ref 65–100)
GRAM STN SPEC: NORMAL
HCT VFR BLD AUTO: 30 % (ref 35–47)
HGB BLD-MCNC: 10.1 G/DL (ref 11.5–16)
POTASSIUM SERPL-SCNC: 3.6 MMOL/L (ref 3.5–5.1)
SERVICE CMNT-IMP: NORMAL
SODIUM SERPL-SCNC: 137 MMOL/L (ref 136–145)

## 2024-02-14 PROCEDURE — 6360000002 HC RX W HCPCS: Performed by: PHYSICIAN ASSISTANT

## 2024-02-14 PROCEDURE — 6370000000 HC RX 637 (ALT 250 FOR IP): Performed by: PHYSICIAN ASSISTANT

## 2024-02-14 PROCEDURE — 97530 THERAPEUTIC ACTIVITIES: CPT

## 2024-02-14 PROCEDURE — 36415 COLL VENOUS BLD VENIPUNCTURE: CPT

## 2024-02-14 PROCEDURE — 97116 GAIT TRAINING THERAPY: CPT

## 2024-02-14 PROCEDURE — 80048 BASIC METABOLIC PNL TOTAL CA: CPT

## 2024-02-14 PROCEDURE — 85018 HEMOGLOBIN: CPT

## 2024-02-14 PROCEDURE — 97161 PT EVAL LOW COMPLEX 20 MIN: CPT

## 2024-02-14 PROCEDURE — APPNB60 APP NON BILLABLE TIME 46-60 MINS: Performed by: NURSE PRACTITIONER

## 2024-02-14 PROCEDURE — 85014 HEMATOCRIT: CPT

## 2024-02-14 PROCEDURE — 2580000003 HC RX 258: Performed by: PHYSICIAN ASSISTANT

## 2024-02-14 RX ORDER — CEFADROXIL 500 MG/1
500 CAPSULE ORAL 2 TIMES DAILY
Qty: 14 CAPSULE | Refills: 0 | Status: SHIPPED | OUTPATIENT
Start: 2024-02-14 | End: 2024-02-21

## 2024-02-14 RX ORDER — OXYCODONE HYDROCHLORIDE 5 MG/1
2.5-5 TABLET ORAL EVERY 4 HOURS PRN
Qty: 42 TABLET | Refills: 0 | Status: SHIPPED | OUTPATIENT
Start: 2024-02-14 | End: 2024-02-21

## 2024-02-14 RX ORDER — SENNA AND DOCUSATE SODIUM 50; 8.6 MG/1; MG/1
1 TABLET, FILM COATED ORAL 2 TIMES DAILY
Qty: 14 TABLET | Refills: 0 | Status: SHIPPED
Start: 2024-02-14 | End: 2024-02-21

## 2024-02-14 RX ADMIN — OXYCODONE 5 MG: 5 TABLET ORAL at 15:03

## 2024-02-14 RX ADMIN — METOPROLOL TARTRATE 25 MG: 25 TABLET, FILM COATED ORAL at 09:18

## 2024-02-14 RX ADMIN — WATER 2000 MG: 1 INJECTION INTRAMUSCULAR; INTRAVENOUS; SUBCUTANEOUS at 04:50

## 2024-02-14 RX ADMIN — KETOROLAC TROMETHAMINE 15 MG: 30 INJECTION, SOLUTION INTRAMUSCULAR; INTRAVENOUS at 06:04

## 2024-02-14 RX ADMIN — KETOROLAC TROMETHAMINE 15 MG: 30 INJECTION, SOLUTION INTRAMUSCULAR; INTRAVENOUS at 12:48

## 2024-02-14 RX ADMIN — CITALOPRAM 20 MG: 20 TABLET, FILM COATED ORAL at 09:15

## 2024-02-14 RX ADMIN — SENNOSIDES AND DOCUSATE SODIUM 1 TABLET: 8.6; 5 TABLET ORAL at 09:16

## 2024-02-14 RX ADMIN — BUPROPION HYDROCHLORIDE 150 MG: 150 TABLET, EXTENDED RELEASE ORAL at 09:14

## 2024-02-14 RX ADMIN — APIXABAN 2.5 MG: 2.5 TABLET, FILM COATED ORAL at 09:12

## 2024-02-14 RX ADMIN — OXYCODONE 5 MG: 5 TABLET ORAL at 09:13

## 2024-02-14 RX ADMIN — SODIUM CHLORIDE, PRESERVATIVE FREE 10 ML: 5 INJECTION INTRAVENOUS at 09:17

## 2024-02-14 RX ADMIN — NALTREXONE HYDROCHLORIDE 50 MG: 50 TABLET, FILM COATED ORAL at 09:15

## 2024-02-14 RX ADMIN — ACETAMINOPHEN 500 MG: 500 TABLET ORAL at 09:16

## 2024-02-14 RX ADMIN — KETOROLAC TROMETHAMINE 15 MG: 30 INJECTION, SOLUTION INTRAMUSCULAR; INTRAVENOUS at 00:38

## 2024-02-14 NOTE — PLAN OF CARE
Problem: Pain  Goal: Verbalizes/displays adequate comfort level or baseline comfort level  Outcome: Progressing     Problem: Safety - Adult  Goal: Free from fall injury  Outcome: Progressing     Problem: Skin/Tissue Integrity - Adult  Goal: Incisions, wounds, or drain sites healing without S/S of infection  Outcome: Progressing     Problem: Musculoskeletal - Adult  Goal: Return mobility to safest level of function  Outcome: Progressing  Goal: Maintain proper alignment of affected body part  Outcome: Progressing  Goal: Return ADL status to a safe level of function  Outcome: Progressing

## 2024-02-14 NOTE — PLAN OF CARE
Problem: Pain  Goal: Verbalizes/displays adequate comfort level or baseline comfort level  2/14/2024 1215 by Rajendra Henson, RN  Outcome: Completed     Problem: Safety - Adult  Goal: Free from fall injury  2/14/2024 1215 by Rajendra Henson, RN  Outcome: Completed

## 2024-02-14 NOTE — PROGRESS NOTES
Ortho NP Note    POD# 1  s/p REVISION LEFT TOTAL KNEE ARTHROPLASTY   Pt seen with Dr. Mcneil. No visitor present.     Pt resting in bed, in NAD.   Reports postop pain well controlled, received 1 dose of oxycodone  last night   Able to ambulate to bathroom overnight.   Tolerating regular diet. No nausea.   No other complaints or concerns.     Discussed importance of positioning/exercises to promote full extension during immediate postop period    VSS Afebrile    Visit Vitals  BP (!) 110/41   Pulse 69   Temp 98.6 °F (37 °C) (Oral)   Resp 16   Ht 1.702 m (5' 7\")   Wt 98.9 kg (218 lb)   SpO2 97%   BMI 34.14 kg/m²       Voiding status: voiding          Labs    Lab Results   Component Value Date/Time    HGB 10.1 02/14/2024 12:45 AM      Lab Results   Component Value Date/Time    INR 1.0 02/05/2024 08:45 AM      Lab Results   Component Value Date/Time     02/14/2024 12:45 AM    K 3.6 02/14/2024 12:45 AM     02/14/2024 12:45 AM    CO2 25 02/14/2024 12:45 AM    BUN 24 02/14/2024 12:45 AM     Recent Glucose Results:   Glucose   Date Value Ref Range Status   02/14/2024 114 (H) 65 - 100 mg/dL Final   02/05/2024 88 65 - 100 mg/dL Final   09/18/2023 93 65 - 100 mg/dL Final           Body mass index is 34.14 kg/m². : A BMI > 30 is classified as obesity and > 40 is classified as morbid obesity.       Ace wrap removed. Dressing c.d.i  Cryotherapy in place over incision  Calves soft and supple; No pain with passive stretch  Sensation and motor intact. +PF/DF/EHL intact   SCDs for mechanical DVT proph while in bed     PLAN:  1) PT BID - WBAT  2) Eliquis 2.5mg PO BID for DVT Prophylaxis. Encouraged early mobilization, bed exercises, and SCD use.  3) Pain control - scheduled tylenol  and toradol, and prn  oxycodone  .  4) Prophylactic abx - duricef x 7 days   5) Post op care - Continue bowel regimen, encouraged IS.  Aquacel to remain in place x 7 days unless integrity is lost.   6) Readniess for discharge:     [x] Vital Signs 
TRANSFER - IN REPORT:    Verbal report received from Cat RN on Maryann Gallagher  being received from PACU for routine post-op      Report consisted of patient's Situation, Background, Assessment and   Recommendations(SBAR).     Information from the following report(s) Surgery Report, Intake/Output, and MAR was reviewed with the receiving nurse Jacquelin Paula RN    Opportunity for questions and clarification was provided.      Assessment completed upon patient's arrival to unit and care assumed.    
Barthel Index and the EuroQoL-5D. Quality of Life Research, 13, 038-58                                                                                                                                                                                                                                 Pain Ratin/10   Pain Intervention(s):   nursing notified and ice    Activity Tolerance:   Good    After treatment:   Patient left in no apparent distress sitting up in chair, Call bell within reach, and nurse notified.      COMMUNICATION/EDUCATION:   The patient's plan of care was discussed with: registered nurse, physician, and     Patient Education  Education Given To: Patient  Education Provided: Role of Therapy;Precautions;Energy Conservation;Fall Prevention Strategies;Plan of Care;Equipment;Home Exercise Program;Transfer Training  Education Provided Comments: PT Discharge Instructions/HEP education provided  Education Method: Demonstration;Verbal  Barriers to Learning: None  Education Outcome: Verbalized understanding;Demonstrated understanding    Thank you for this referral.  Marilu Hernández, PT  Minutes: 30

## 2024-02-14 NOTE — DISCHARGE SUMMARY
Facility-Administered Medications: None        Allergies:    Allergies   Allergen Reactions    Cortisone Itching    Lidocaine      Other reaction(s): Unknown (comments)    Polyethylene Glycol 3350 Itching        Hospital Course:  The patient underwent surgery.  Complications:  None; patient tolerated the procedure well. Was taken to the PACU in stable condition and then transferred to the ortho floor.      Perioperative Antibiotics:  Ancef     Postoperative Pain Management:  Oxycodone & Tylenol     DVT Prophylaxis:   Eliquis     Postoperative transfusions:    Number of units banked PRBCs =   none     Post Op complications: none    Hemoglobin at discharge:    Lab Results   Component Value Date/Time    HGB 10.1 (L) 02/14/2024 12:45 AM    INR 1.0 02/05/2024 08:45 AM       Dressing remained clean, dry and intact. No significant erythema or swelling. Wound appears to be healing without any evidence of infection. Neurovascular exam found to be within normal limits.     Physical Therapy started following surgery and participated in bed mobility, transfers and ambulation.          Discharged to: Home with .    Condition on Discharge:   Stable    Discharge instructions:  - Anticoagulate with Eliquis   - Take pain medications as prescribed  - Resume pre hospital diet      - Discharge activity: activity as tolerated  - Ambulate with assistive device as needed.  - Weight bearing status - WBAT  - Wound Care Keep wound clean and dry.  See discharge instruction sheet.            -DISCHARGE MEDICATION LIST        Medication List        START taking these medications      apixaban 2.5 MG Tabs tablet  Commonly known as: ELIQUIS  Take 1 tablet by mouth 2 times daily     cefadroxil 500 MG capsule  Commonly known as: DURICEF  Take 1 capsule by mouth 2 times daily for 7 days     oxyCODONE 5 MG immediate release tablet  Commonly known as: ROXICODONE  Take 0.5-1 tablets by mouth every 4 hours as needed for Pain for up to 7 days. Take

## 2024-02-14 NOTE — OP NOTE
[facility]  OPERATIVE REPORT    Name:  CAROLYN AG  MR#:  [id number]  :  1965  ACCOUNT #:  [id number]  DATE OF SERVICE:  2024    PREOPERATIVE DIAGNOSIS:  Previously infected left total knee replacement, status post placement of long-term antibiotic-impregnated articulating spacer.    POSTOPERATIVE DIAGNOSIS:  Previously infected left total knee replacement, status post placement of long-term antibiotic-impregnated articulating spacer.    PROCEDURE PERFORMED:  Revision left total knee replacement.    SURGEON:  Bam Mcneil MD    ASSISTANT:  Vishnu Alcantar SA    ANESTHESIA:  Spinal with sedation as well as adductor canal block.    COMPLICATIONS:  None.    SPECIMENS REMOVED:  Culture x3 and frozen section x1.    IMPLANTS:  DonJoy size 7 Empowr revision femur with 14 x 65 mm cemented stem and 5 mm distal medial and lateral augments, 5 mm posteromedial and posterolateral augments; size 5 tibial tray with 14 x 65 mm cemented stem and 10 mm medial and lateral augment build up, 19-mm VVC polyethylene insert, Oscar size A metaphyseal tibial cone.    ESTIMATED BLOOD LOSS:  250 mL.    INDICATIONS:  The patient is a 58-year-old female, who is a few years out from removal of infected left total knee replacement and placement of antibiotic articulating spacer, for treatment of deep periprosthetic infection.  Cultures never identified an organism, so she completed a long-term course of empiric IV antibiotic therapy.  At the time of spacer placement, she had to have tissue coverage with a medial gastroc flap.  She had done reasonably well, and after infection markers completely normalized, she elected to keep the articulating spacer in place once she was functioning well without pain.  She has had progressive pain over the last several months and presents now for revision left total knee replacement.  Risks, benefits, and alternatives of procedure were reviewed with her in detail,

## 2024-02-22 LAB
BACTERIA SPEC CULT: NORMAL
SERVICE CMNT-IMP: NORMAL

## 2024-02-24 PROBLEM — Z01.818 PREOPERATIVE EXAMINATION: Status: RESOLVED | Noted: 2024-01-25 | Resolved: 2024-02-24

## 2024-02-26 LAB
BACTERIA SPEC CULT: NORMAL
SERVICE CMNT-IMP: NORMAL

## 2024-02-27 LAB
BACTERIA SPEC CULT: NORMAL
GRAM STN SPEC: NORMAL
SERVICE CMNT-IMP: NORMAL

## 2024-03-04 LAB
SERVICE CMNT-IMP: NORMAL

## 2024-03-11 LAB
BACTERIA SPEC CULT: NORMAL
SERVICE CMNT-IMP: NORMAL

## 2024-03-18 LAB
BACTERIA SPEC CULT: NORMAL
SERVICE CMNT-IMP: NORMAL

## 2024-04-16 ENCOUNTER — OFFICE VISIT (OUTPATIENT)
Facility: CLINIC | Age: 59
End: 2024-04-16
Payer: MEDICARE

## 2024-04-16 VITALS
BODY MASS INDEX: 34.25 KG/M2 | SYSTOLIC BLOOD PRESSURE: 131 MMHG | HEART RATE: 51 BPM | OXYGEN SATURATION: 95 % | DIASTOLIC BLOOD PRESSURE: 78 MMHG | WEIGHT: 218.2 LBS | RESPIRATION RATE: 16 BRPM | TEMPERATURE: 97.1 F | HEIGHT: 67 IN

## 2024-04-16 DIAGNOSIS — E78.2 MIXED HYPERLIPIDEMIA: ICD-10-CM

## 2024-04-16 DIAGNOSIS — I25.10 CORONARY ARTERY DISEASE INVOLVING NATIVE CORONARY ARTERY OF NATIVE HEART WITHOUT ANGINA PECTORIS: ICD-10-CM

## 2024-04-16 DIAGNOSIS — I10 BENIGN ESSENTIAL HYPERTENSION: Primary | ICD-10-CM

## 2024-04-16 DIAGNOSIS — I10 BENIGN ESSENTIAL HYPERTENSION: ICD-10-CM

## 2024-04-16 PROBLEM — Z01.818 ENCOUNTER FOR PREADMISSION TESTING: Status: RESOLVED | Noted: 2024-02-08 | Resolved: 2024-04-16

## 2024-04-16 PROBLEM — M25.562 CHRONIC PAIN OF LEFT KNEE: Status: RESOLVED | Noted: 2024-01-25 | Resolved: 2024-04-16

## 2024-04-16 PROBLEM — G89.29 CHRONIC PAIN OF LEFT KNEE: Status: RESOLVED | Noted: 2024-01-25 | Resolved: 2024-04-16

## 2024-04-16 PROCEDURE — 99214 OFFICE O/P EST MOD 30 MIN: CPT | Performed by: NURSE PRACTITIONER

## 2024-04-16 PROCEDURE — G8417 CALC BMI ABV UP PARAM F/U: HCPCS | Performed by: NURSE PRACTITIONER

## 2024-04-16 PROCEDURE — 3078F DIAST BP <80 MM HG: CPT | Performed by: NURSE PRACTITIONER

## 2024-04-16 PROCEDURE — 1036F TOBACCO NON-USER: CPT | Performed by: NURSE PRACTITIONER

## 2024-04-16 PROCEDURE — 3017F COLORECTAL CA SCREEN DOC REV: CPT | Performed by: NURSE PRACTITIONER

## 2024-04-16 PROCEDURE — G8427 DOCREV CUR MEDS BY ELIG CLIN: HCPCS | Performed by: NURSE PRACTITIONER

## 2024-04-16 PROCEDURE — 3075F SYST BP GE 130 - 139MM HG: CPT | Performed by: NURSE PRACTITIONER

## 2024-04-16 RX ORDER — ASPIRIN 81 MG/1
81 TABLET ORAL DAILY
COMMUNITY
Start: 2021-10-26

## 2024-04-16 ASSESSMENT — PATIENT HEALTH QUESTIONNAIRE - PHQ9
SUM OF ALL RESPONSES TO PHQ QUESTIONS 1-9: 0
SUM OF ALL RESPONSES TO PHQ9 QUESTIONS 1 & 2: 0
SUM OF ALL RESPONSES TO PHQ QUESTIONS 1-9: 0
1. LITTLE INTEREST OR PLEASURE IN DOING THINGS: NOT AT ALL
SUM OF ALL RESPONSES TO PHQ QUESTIONS 1-9: 0
2. FEELING DOWN, DEPRESSED OR HOPELESS: NOT AT ALL
SUM OF ALL RESPONSES TO PHQ QUESTIONS 1-9: 0

## 2024-04-16 NOTE — PROGRESS NOTES
Maryann Gallagher is a 58 y.o. female    Chief Complaint   Patient presents with    Follow-up      prediabetes, HLD, HTN       /78   Pulse 51   Temp 97.1 °F (36.2 °C)   Resp 16   Ht 1.702 m (5' 7\")   Wt 99 kg (218 lb 3.2 oz)   SpO2 95%   BMI 34.17 kg/m²         1. Have you been to the ER, urgent care clinic since your last visit?  Hospitalized since your last visit? No    2. Have you seen or consulted any other health care providers outside of the [facility] since your last visit?  Include any pap smears or colon screening. No    Learning Assessment:       No data to display                Fall Risk Assessment:      4/16/2024     9:35 AM   Amb Fall Risk Assessment and TUG Test   Do you feel unsteady or are you worried about falling?  no   2 or more falls in past year? no   Fall with injury in past year? no       Abuse Screening:       No data to display                ADL Screening:       No data to display                
   COLONOSCOPY  2023    Normal - RPT 10 Years ([place])    CORONARY ANGIOPLASTY WITH STENT PLACEMENT      2 STENTS    REVISION TOTAL KNEE ARTHROPLASTY Left     X3 (INCLUDING \"radical debridement w/exchange of ABX spacer\")    REVISION TOTAL KNEE ARTHROPLASTY Left 2024    REVISION LEFT TOTAL KNEE ARTHROPLASTY performed by Bam Mcneil MD at [facility] MAIN OR    TOTAL KNEE ARTHROPLASTY Left       Social History     Socioeconomic History    Marital status:      Spouse name: Not on file    Number of children: Not on file    Years of education: Not on file    Highest education level: Not on file   Occupational History    Not on file   Tobacco Use    Smoking status: Former     Current packs/day: 0.00     Average packs/day: 1 pack/day for 32.0 years (32.0 ttl pk-yrs)     Types: Cigarettes     Start date: 1978     Quit date:      Years since quittin.2     Passive exposure: Past    Smokeless tobacco: Never   Vaping Use    Vaping Use: Never used   Substance and Sexual Activity    Alcohol use: No    Drug use: No    Sexual activity: Not Currently   Other Topics Concern    Not on file   Social History Narrative    Not on file     Social Determinants of Health     Financial Resource Strain: Medium Risk (2023)    Overall Financial Resource Strain (CARDIA)     Difficulty of Paying Living Expenses: Somewhat hard   Food Insecurity: Not on file (2023)   Recent Concern: Food Insecurity - Food Insecurity Present (2023)    Hunger Vital Sign     Worried About Running Out of Food in the Last Year: Sometimes true     Ran Out of Food in the Last Year: Sometimes true   Transportation Needs: Unknown (2023)    PRAPARE - Transportation     Lack of Transportation (Medical): Not on file     Lack of Transportation (Non-Medical): No   Physical Activity: Inactive (9/15/2023)    Exercise Vital Sign     Days of Exercise per Week: 0 days     Minutes of Exercise per Session: 30 min   Stress: Not on file

## 2024-04-17 LAB
ALBUMIN SERPL-MCNC: 3.8 G/DL (ref 3.5–5)
ALBUMIN/GLOB SERPL: 1.2 (ref 1.1–2.2)
ALP SERPL-CCNC: 75 U/L (ref 45–117)
ALT SERPL-CCNC: 16 U/L (ref 12–78)
ANION GAP SERPL CALC-SCNC: 2 MMOL/L (ref 5–15)
AST SERPL-CCNC: 11 U/L (ref 15–37)
BASOPHILS # BLD: 0.1 K/UL (ref 0–0.1)
BASOPHILS NFR BLD: 1 % (ref 0–1)
BILIRUB SERPL-MCNC: 0.7 MG/DL (ref 0.2–1)
BUN SERPL-MCNC: 17 MG/DL (ref 6–20)
BUN/CREAT SERPL: 18 (ref 12–20)
CALCIUM SERPL-MCNC: 9.9 MG/DL (ref 8.5–10.1)
CHLORIDE SERPL-SCNC: 103 MMOL/L (ref 97–108)
CHOLEST SERPL-MCNC: 114 MG/DL
CO2 SERPL-SCNC: 33 MMOL/L (ref 21–32)
COMMENT:: NORMAL
CREAT SERPL-MCNC: 0.95 MG/DL (ref 0.55–1.02)
DIFFERENTIAL METHOD BLD: NORMAL
EOSINOPHIL # BLD: 0.3 K/UL (ref 0–0.4)
EOSINOPHIL NFR BLD: 4 % (ref 0–7)
ERYTHROCYTE [DISTWIDTH] IN BLOOD BY AUTOMATED COUNT: 13 % (ref 11.5–14.5)
GLOBULIN SER CALC-MCNC: 3.3 G/DL (ref 2–4)
GLUCOSE SERPL-MCNC: 102 MG/DL (ref 65–100)
HCT VFR BLD AUTO: 37.4 % (ref 35–47)
HDLC SERPL-MCNC: 41 MG/DL
HDLC SERPL: 2.8 (ref 0–5)
HGB BLD-MCNC: 12 G/DL (ref 11.5–16)
IMM GRANULOCYTES # BLD AUTO: 0 K/UL (ref 0–0.04)
IMM GRANULOCYTES NFR BLD AUTO: 0 % (ref 0–0.5)
LDLC SERPL CALC-MCNC: 47.2 MG/DL (ref 0–100)
LYMPHOCYTES # BLD: 1.9 K/UL (ref 0.8–3.5)
LYMPHOCYTES NFR BLD: 25 % (ref 12–49)
MCH RBC QN AUTO: 28.4 PG (ref 26–34)
MCHC RBC AUTO-ENTMCNC: 32.1 G/DL (ref 30–36.5)
MCV RBC AUTO: 88.6 FL (ref 80–99)
MONOCYTES # BLD: 0.6 K/UL (ref 0–1)
MONOCYTES NFR BLD: 8 % (ref 5–13)
NEUTS SEG # BLD: 4.6 K/UL (ref 1.8–8)
NEUTS SEG NFR BLD: 62 % (ref 32–75)
NRBC # BLD: 0 K/UL (ref 0–0.01)
NRBC BLD-RTO: 0 PER 100 WBC
PLATELET # BLD AUTO: 229 K/UL (ref 150–400)
PMV BLD AUTO: 12.6 FL (ref 8.9–12.9)
POTASSIUM SERPL-SCNC: 4.4 MMOL/L (ref 3.5–5.1)
PROT SERPL-MCNC: 7.1 G/DL (ref 6.4–8.2)
RBC # BLD AUTO: 4.22 M/UL (ref 3.8–5.2)
SODIUM SERPL-SCNC: 138 MMOL/L (ref 136–145)
SPECIMEN HOLD: NORMAL
TRIGL SERPL-MCNC: 129 MG/DL
VLDLC SERPL CALC-MCNC: 25.8 MG/DL
WBC # BLD AUTO: 7.4 K/UL (ref 3.6–11)

## 2024-05-03 PROBLEM — I73.9 PERIPHERAL ARTERIAL DISEASE (HCC): Status: ACTIVE | Noted: 2024-05-03

## 2024-05-06 NOTE — PERIOP NOTES
Called pharmacy refill too soon, last filled 3/15/24 for 3 month.  Needs refill    No prior auth needed.    Medication Quantity Refills Start End   Glucose Blood (TRUE METRIX BLOOD GLUCOSE TEST) In Vitro Strip 200 strip 3 5/6/2024       preop labs, ekg faxed to Dr Laura Sanchez for review. Requested for office to fax clearance note. Fax confirmed.

## 2024-07-01 DIAGNOSIS — G47.09 OTHER INSOMNIA: ICD-10-CM

## 2024-07-01 RX ORDER — LISINOPRIL AND HYDROCHLOROTHIAZIDE 20; 12.5 MG/1; MG/1
1 TABLET ORAL EVERY MORNING
Qty: 90 TABLET | Refills: 0 | Status: SHIPPED | OUTPATIENT
Start: 2024-07-01

## 2024-07-01 RX ORDER — ATORVASTATIN CALCIUM 80 MG/1
80 TABLET, FILM COATED ORAL NIGHTLY
Qty: 90 TABLET | Refills: 0 | Status: SHIPPED | OUTPATIENT
Start: 2024-07-01

## 2024-07-01 RX ORDER — TRAZODONE HYDROCHLORIDE 50 MG/1
TABLET ORAL
Qty: 90 TABLET | Refills: 0 | Status: SHIPPED | OUTPATIENT
Start: 2024-07-01

## 2024-07-01 NOTE — TELEPHONE ENCOUNTER
PCP: Stephany Zuñiga APRN - CNP     Last appt: 4/16/2024    Future Appointments   Date Time Provider Department Center   10/17/2024  9:00 AM Stephany Zuñiga APRN - CNP [facility] BS AMB          Requested Prescriptions     Pending Prescriptions Disp Refills    atorvastatin (LIPITOR) 80 MG tablet [Pharmacy Med Name: ATORVASTATIN CALCIUM 80 MG Tablet] 90 tablet 3     Sig: TAKE 1 TABLET EVERY DAY    traZODone (DESYREL) 50 MG tablet [Pharmacy Med Name: TRAZODONE HYDROCHLORIDE 50 MG Tablet] 90 tablet 3     Sig: TAKE 1/2 TO 1 TABLET AT BEDTIME    metoprolol tartrate (LOPRESSOR) 25 MG tablet [Pharmacy Med Name: METOPROLOL TARTRATE 25 MG Tablet] 180 tablet 3     Sig: TAKE 1 TABLET TWICE DAILY    lisinopril-hydroCHLOROthiazide (PRINZIDE;ZESTORETIC) 20-12.5 MG per tablet [Pharmacy Med Name: LISINOPRIL/HYDROCHLOROTHIAZIDE 20-12.5 MG Tablet] 90 tablet 3     Sig: TAKE 1 TABLET EVERY DAY

## 2024-07-14 RX ORDER — NALTREXONE HYDROCHLORIDE 50 MG/1
50 TABLET, FILM COATED ORAL DAILY
Qty: 90 TABLET | Refills: 3 | Status: SHIPPED | OUTPATIENT
Start: 2024-07-14

## 2024-07-15 RX ORDER — CITALOPRAM 20 MG/1
20 TABLET ORAL DAILY
Qty: 90 TABLET | Refills: 3 | Status: SHIPPED | OUTPATIENT
Start: 2024-07-15

## 2024-08-01 ENCOUNTER — TELEPHONE (OUTPATIENT)
Facility: CLINIC | Age: 59
End: 2024-08-01

## 2024-08-01 NOTE — TELEPHONE ENCOUNTER
Pt stated she has salmonella and was only given meds for 5 days and wants to know if there is anything else she needs to do/ if she needs to be seen

## 2024-08-01 NOTE — TELEPHONE ENCOUNTER
I called and spoke to pt verified name and . Pt stated that Monday morning she started off with diarrhea, Bp dropped yesterday 80/50 and was dehydrated went to the Er. Pt was put on Ciprofloxacin for 5 days, pt is doing well will call back if anything changes.

## 2024-08-21 ENCOUNTER — PATIENT MESSAGE (OUTPATIENT)
Facility: CLINIC | Age: 59
End: 2024-08-21

## 2024-09-13 DIAGNOSIS — G47.09 OTHER INSOMNIA: ICD-10-CM

## 2024-09-13 RX ORDER — METOPROLOL TARTRATE 25 MG/1
25 TABLET, FILM COATED ORAL 2 TIMES DAILY
Qty: 180 TABLET | Refills: 3 | Status: SHIPPED | OUTPATIENT
Start: 2024-09-13

## 2024-09-13 RX ORDER — TRAZODONE HYDROCHLORIDE 50 MG/1
TABLET, FILM COATED ORAL
Qty: 90 TABLET | Refills: 3 | Status: SHIPPED | OUTPATIENT
Start: 2024-09-13

## 2024-09-13 RX ORDER — ATORVASTATIN CALCIUM 80 MG/1
80 TABLET, FILM COATED ORAL NIGHTLY
Qty: 90 TABLET | Refills: 3 | Status: SHIPPED | OUTPATIENT
Start: 2024-09-13

## 2024-09-13 RX ORDER — LISINOPRIL AND HYDROCHLOROTHIAZIDE 12.5; 2 MG/1; MG/1
1 TABLET ORAL EVERY MORNING
Qty: 90 TABLET | Refills: 3 | Status: SHIPPED | OUTPATIENT
Start: 2024-09-13

## 2024-09-16 ENCOUNTER — PATIENT MESSAGE (OUTPATIENT)
Facility: CLINIC | Age: 59
End: 2024-09-16

## 2024-09-16 DIAGNOSIS — Z12.31 ENCOUNTER FOR SCREENING MAMMOGRAM FOR MALIGNANT NEOPLASM OF BREAST: Primary | ICD-10-CM

## 2024-09-20 RX ORDER — FUROSEMIDE 20 MG
TABLET ORAL
Qty: 90 TABLET | Refills: 1 | Status: SHIPPED | OUTPATIENT
Start: 2024-09-20

## 2024-10-03 NOTE — TELEPHONE ENCOUNTER
Future Appointments:  Future Appointments   Date Time Provider Department Center   10/17/2024  9:00 AM Stephany Zuñiga, APRN - CNP [facility] ECC DEP        Last Appointment With Me:  4/16/2024     Requested Prescriptions     Pending Prescriptions Disp Refills    celecoxib (CELEBREX) 100 MG capsule [Pharmacy Med Name: CELECOXIB 100MG CAP]  0

## 2024-10-03 NOTE — TELEPHONE ENCOUNTER
I called and spoke to pt. Pt is taking the medication once a day, she's weaning herself off the medication.. She has enough of the medication to last her until her next appt with you if you want to talk about it then.

## 2024-10-04 RX ORDER — CELECOXIB 100 MG/1
100 CAPSULE ORAL DAILY
Qty: 30 CAPSULE | Refills: 1 | Status: SHIPPED | OUTPATIENT
Start: 2024-10-04

## 2024-10-14 DIAGNOSIS — E78.2 MIXED HYPERLIPIDEMIA: ICD-10-CM

## 2024-10-14 DIAGNOSIS — E66.01 SEVERE OBESITY (BMI 35.0-35.9 WITH COMORBIDITY): ICD-10-CM

## 2024-10-14 DIAGNOSIS — R73.03 PREDIABETES: ICD-10-CM

## 2024-10-14 RX ORDER — SEMAGLUTIDE 2.68 MG/ML
INJECTION, SOLUTION SUBCUTANEOUS
Qty: 3 ML | Refills: 5 | Status: SHIPPED | OUTPATIENT
Start: 2024-10-14

## 2024-10-14 SDOH — HEALTH STABILITY: PHYSICAL HEALTH: ON AVERAGE, HOW MANY DAYS PER WEEK DO YOU ENGAGE IN MODERATE TO STRENUOUS EXERCISE (LIKE A BRISK WALK)?: 3 DAYS

## 2024-10-14 SDOH — HEALTH STABILITY: PHYSICAL HEALTH: ON AVERAGE, HOW MANY MINUTES DO YOU ENGAGE IN EXERCISE AT THIS LEVEL?: 30 MIN

## 2024-10-14 ASSESSMENT — LIFESTYLE VARIABLES
HOW MANY STANDARD DRINKS CONTAINING ALCOHOL DO YOU HAVE ON A TYPICAL DAY: PATIENT DOES NOT DRINK
HOW MANY STANDARD DRINKS CONTAINING ALCOHOL DO YOU HAVE ON A TYPICAL DAY: 0
HOW OFTEN DO YOU HAVE SIX OR MORE DRINKS ON ONE OCCASION: 1
HOW OFTEN DO YOU HAVE A DRINK CONTAINING ALCOHOL: NEVER
HOW OFTEN DO YOU HAVE A DRINK CONTAINING ALCOHOL: 1

## 2024-10-14 ASSESSMENT — PATIENT HEALTH QUESTIONNAIRE - PHQ9
SUM OF ALL RESPONSES TO PHQ QUESTIONS 1-9: 0
SUM OF ALL RESPONSES TO PHQ9 QUESTIONS 1 & 2: 0
SUM OF ALL RESPONSES TO PHQ QUESTIONS 1-9: 0
2. FEELING DOWN, DEPRESSED OR HOPELESS: NOT AT ALL
SUM OF ALL RESPONSES TO PHQ QUESTIONS 1-9: 0
SUM OF ALL RESPONSES TO PHQ QUESTIONS 1-9: 0
1. LITTLE INTEREST OR PLEASURE IN DOING THINGS: NOT AT ALL

## 2024-10-14 NOTE — TELEPHONE ENCOUNTER
Future Appointments:  Future Appointments   Date Time Provider Department Center   10/17/2024  9:00 AM Stephany Zuñiga, APRN - CNP [facility] BS ECC DEP        Last Appointment With Me:  4/16/2024     Requested Prescriptions     Pending Prescriptions Disp Refills    OZEMPIC, 2 MG/DOSE, 8 MG/3ML SOPN sc injection [Pharmacy Med Name: Ozempic (2 MG/DOSE) 8 MG/3ML Subcutaneous Solution Pen-injector] 3 mL 0     Sig: INJECT 2 MG INTO THE SKIN ONCE A WEEK

## 2024-10-17 ENCOUNTER — OFFICE VISIT (OUTPATIENT)
Facility: CLINIC | Age: 59
End: 2024-10-17

## 2024-10-17 VITALS
TEMPERATURE: 97.6 F | DIASTOLIC BLOOD PRESSURE: 80 MMHG | BODY MASS INDEX: 29.63 KG/M2 | WEIGHT: 188.8 LBS | HEIGHT: 67 IN | SYSTOLIC BLOOD PRESSURE: 140 MMHG | OXYGEN SATURATION: 98 % | HEART RATE: 52 BPM

## 2024-10-17 DIAGNOSIS — Z23 NEEDS FLU SHOT: ICD-10-CM

## 2024-10-17 DIAGNOSIS — Z87.891 HISTORY OF NICOTINE DEPENDENCE: ICD-10-CM

## 2024-10-17 DIAGNOSIS — Z00.00 MEDICARE ANNUAL WELLNESS VISIT, SUBSEQUENT: Primary | ICD-10-CM

## 2024-10-17 DIAGNOSIS — E55.9 VITAMIN D DEFICIENCY: ICD-10-CM

## 2024-10-17 DIAGNOSIS — I25.10 CORONARY ARTERY DISEASE INVOLVING NATIVE CORONARY ARTERY OF NATIVE HEART WITHOUT ANGINA PECTORIS: ICD-10-CM

## 2024-10-17 DIAGNOSIS — G47.09 OTHER INSOMNIA: ICD-10-CM

## 2024-10-17 DIAGNOSIS — R73.03 PREDIABETES: ICD-10-CM

## 2024-10-17 DIAGNOSIS — E66.3 OVERWEIGHT (BMI 25.0-29.9): ICD-10-CM

## 2024-10-17 DIAGNOSIS — I10 BENIGN ESSENTIAL HYPERTENSION: ICD-10-CM

## 2024-10-17 DIAGNOSIS — F39 MOOD DISORDER (HCC): ICD-10-CM

## 2024-10-17 DIAGNOSIS — I25.2 HISTORY OF MI (MYOCARDIAL INFARCTION): ICD-10-CM

## 2024-10-17 DIAGNOSIS — E53.8 B12 DEFICIENCY: ICD-10-CM

## 2024-10-17 DIAGNOSIS — Z29.9 PREVENTIVE MEASURE: ICD-10-CM

## 2024-10-17 DIAGNOSIS — R42 DIZZINESS: ICD-10-CM

## 2024-10-17 DIAGNOSIS — M25.569 ARTHRALGIA OF KNEE, UNSPECIFIED LATERALITY: ICD-10-CM

## 2024-10-17 DIAGNOSIS — E78.2 MIXED HYPERLIPIDEMIA: ICD-10-CM

## 2024-10-17 DIAGNOSIS — R01.1 CARDIAC MURMUR: ICD-10-CM

## 2024-10-17 DIAGNOSIS — H61.23 BILATERAL IMPACTED CERUMEN: ICD-10-CM

## 2024-10-17 PROBLEM — T84.54XS INFECTION OF TOTAL LEFT KNEE REPLACEMENT, SEQUELA: Status: RESOLVED | Noted: 2024-02-13 | Resolved: 2024-10-17

## 2024-10-17 PROBLEM — Z87.39 HISTORY OF INFECTION OF TOTAL JOINT PROSTHESIS OF KNEE: Status: RESOLVED | Noted: 2024-01-19 | Resolved: 2024-10-17

## 2024-10-17 RX ORDER — CELECOXIB 100 MG/1
100 CAPSULE ORAL DAILY
Qty: 90 CAPSULE | Refills: 3 | Status: SHIPPED | OUTPATIENT
Start: 2024-10-17

## 2024-10-17 RX ORDER — LISINOPRIL 20 MG/1
20 TABLET ORAL DAILY
Qty: 90 TABLET | Refills: 3 | Status: SHIPPED | OUTPATIENT
Start: 2024-10-17

## 2024-10-17 RX ORDER — METOPROLOL TARTRATE 25 MG/1
25 TABLET, FILM COATED ORAL DAILY
Qty: 90 TABLET | Refills: 3 | Status: SHIPPED | OUTPATIENT
Start: 2024-10-17

## 2024-10-17 ASSESSMENT — PATIENT HEALTH QUESTIONNAIRE - PHQ9
SUM OF ALL RESPONSES TO PHQ9 QUESTIONS 1 & 2: 0
SUM OF ALL RESPONSES TO PHQ QUESTIONS 1-9: 0
2. FEELING DOWN, DEPRESSED OR HOPELESS: NOT AT ALL
SUM OF ALL RESPONSES TO PHQ QUESTIONS 1-9: 0
1. LITTLE INTEREST OR PLEASURE IN DOING THINGS: NOT AT ALL

## 2024-10-17 ASSESSMENT — LIFESTYLE VARIABLES
HOW OFTEN DO YOU HAVE A DRINK CONTAINING ALCOHOL: NEVER
HOW MANY STANDARD DRINKS CONTAINING ALCOHOL DO YOU HAVE ON A TYPICAL DAY: PATIENT DOES NOT DRINK

## 2024-10-17 NOTE — PATIENT INSTRUCTIONS
device in the bathroom with you.   Where can you learn more?  Go to https://www.[employer].net/patientEd and enter G117 to learn more about \"Preventing Falls: Care Instructions.\"  Current as of: July 17, 2023  Content Version: 14.2  © 2024 [employer].   Care instructions adapted under license by [employer]. If you have questions about a medical condition or this instruction, always ask your healthcare professional. [employer] disclaims any warranty or liability for your use of this information.           Advance Directives: Care Instructions  Overview  An advance directive is a legal way to state your wishes at the end of your life. It tells your family and your doctor what to do if you can't say what you want.  There are two main types of advance directives. You can change them any time your wishes change.  Living will.  This form tells your family and your doctor your wishes about life support and other treatment. The form is also called a declaration.  Medical power of .  This form lets you name a person to make treatment decisions for you when you can't speak for yourself. This person is called a health care agent (health care proxy, health care surrogate). The form is also called a durable power of  for health care.  If you do not have an advance directive, decisions about your medical care may be made by a family member, or by a doctor or a  who doesn't know you.  It may help to think of an advance directive as a gift to the people who care for you. If you have one, they won't have to make tough decisions by themselves.  For more information, including forms for your state, see the [employer] website (www.caringinfo.org/planning/advance-directives/).  Follow-up care is a key part of your treatment and safety. Be sure to make and go to all appointments, and call your doctor if you are having problems. It's also a good idea to know your test results and keep a

## 2024-10-17 NOTE — PROGRESS NOTES
After verbal order read back of Stephany Zuñiga NP, patient received Flu Shot in Left deltoid.  NDC: 67714-584-68 Lot: [id number] Exp 06.17.2025 . Patient tolerated procedure without complaints and received VIS.   
\"Have you been to the ER, urgent care clinic since your last visit?  Hospitalized since your last visit?\"    YES - When: approximately 1 months ago.  Where and Why: [facility]- [facility] salmonella.    “Have you seen or consulted any other health care providers outside our system since your last visit?”    YES - When: approximately 1 months ago.  Where and Why: Cardiologist- Dr. Ck khalil .    Have you had a mammogram?”   NO 10/29/2024    Date of last Mammogram: 10/11/2023            
Authorizing Provider   metoprolol tartrate (LOPRESSOR) 25 MG tablet Take 1 tablet by mouth daily Yes Stephany Zuñiga APRN - CNP   lisinopril (PRINIVIL;ZESTRIL) 20 MG tablet Take 1 tablet by mouth daily Yes Stephany Zuñiga APRN - CNP   celecoxib (CELEBREX) 100 MG capsule Take 1 capsule by mouth daily Yes Stephany Zuñiga APRN - CNP   semaglutide, 2 MG/DOSE, (OZEMPIC, 2 MG/DOSE,) 8 MG/3ML SOPN sc injection INJECT 2 MG INTO THE SKIN ONCE A WEEK Yes Stephany Zuñiga APRN - CNP   furosemide (LASIX) 20 MG tablet TAKE 1 TABLET EVERY DAY Yes Stephany Zuñiga APRN - CNP   traZODone (DESYREL) 50 MG tablet TAKE 1/2 TO 1 TABLET AT BEDTIME Yes Stephany Zuñiga APRN - CNP   atorvastatin (LIPITOR) 80 MG tablet TAKE 1 TABLET EVERY NIGHT Yes Stephany Zuñiga APRN - CNP   citalopram (CELEXA) 20 MG tablet TAKE 1 TABLET EVERY DAY Yes Stephany Zuñiga APRN - CNP   naltrexone (DEPADE) 50 MG tablet TAKE 1 TABLET EVERY DAY Yes Stephany Zuñiga APRN - CNP   aspirin 81 MG EC tablet Take 1 tablet by mouth daily Yes ProviderRichard MD   vitamin B-12 (CYANOCOBALAMIN) 1000 MCG tablet Take 1 tablet by mouth daily Yes Richard Schmitt MD   Vitamin D (CHOLECALCIFEROL) 25 MCG (1000 UT) TABS tablet Take 2 tablets by mouth Yes Richard Schmitt MD   buPROPion (WELLBUTRIN XL) 300 MG extended release tablet Take 1 tablet by mouth every morning Yes Stephany Zuñiga APRN - CNP   docusate (COLACE, DULCOLAX) 100 MG CAPS Take 100 mg by mouth 2 times daily as needed Yes Automatic Reconciliation, Ar   Flaxseed, Linseed, (FLAX SEED OIL) 1000 MG CAPS Take 1 tablet by mouth daily Yes Automatic Reconciliation, Ar       CareTeam (Including outside providers/suppliers regularly involved in providing care):   Patient Care Team:  Stephany Zuñiga APRN - CNP as PCP - General (Nurse Practitioner)  Stephany Zuñiga APRN - CNP as PCP - Empaneled Provider  Sarwat Stover MD (Cardiology)  Eugenia Rios MD Nunnally, Lauren A, RN as Ambulatory

## 2024-10-18 LAB
25(OH)D3 SERPL-MCNC: 49.2 NG/ML (ref 30–100)
ALBUMIN SERPL-MCNC: 4 G/DL (ref 3.5–5)
ALBUMIN/GLOB SERPL: 1.4 (ref 1.1–2.2)
ALP SERPL-CCNC: 66 U/L (ref 45–117)
ALT SERPL-CCNC: 19 U/L (ref 12–78)
ANION GAP SERPL CALC-SCNC: 4 MMOL/L (ref 2–12)
AST SERPL-CCNC: 11 U/L (ref 15–37)
BASOPHILS # BLD: 0 K/UL (ref 0–0.1)
BASOPHILS NFR BLD: 0 % (ref 0–1)
BILIRUB SERPL-MCNC: 0.6 MG/DL (ref 0.2–1)
BUN SERPL-MCNC: 14 MG/DL (ref 6–20)
BUN/CREAT SERPL: 15 (ref 12–20)
CALCIUM SERPL-MCNC: 9.5 MG/DL (ref 8.5–10.1)
CHLORIDE SERPL-SCNC: 105 MMOL/L (ref 97–108)
CHOLEST SERPL-MCNC: 110 MG/DL
CO2 SERPL-SCNC: 33 MMOL/L (ref 21–32)
CREAT SERPL-MCNC: 0.91 MG/DL (ref 0.55–1.02)
DIFFERENTIAL METHOD BLD: NORMAL
EOSINOPHIL # BLD: 0.1 K/UL (ref 0–0.4)
EOSINOPHIL NFR BLD: 1 % (ref 0–7)
ERYTHROCYTE [DISTWIDTH] IN BLOOD BY AUTOMATED COUNT: 12.5 % (ref 11.5–14.5)
FOLATE SERPL-MCNC: 7.9 NG/ML (ref 5–21)
GLOBULIN SER CALC-MCNC: 2.9 G/DL (ref 2–4)
GLUCOSE SERPL-MCNC: 91 MG/DL (ref 65–100)
HCT VFR BLD AUTO: 39.3 % (ref 35–47)
HDLC SERPL-MCNC: 51 MG/DL
HDLC SERPL: 2.2 (ref 0–5)
HGB BLD-MCNC: 12.2 G/DL (ref 11.5–16)
IMM GRANULOCYTES # BLD AUTO: 0 K/UL (ref 0–0.04)
IMM GRANULOCYTES NFR BLD AUTO: 0 % (ref 0–0.5)
LDLC SERPL CALC-MCNC: 42.8 MG/DL (ref 0–100)
LYMPHOCYTES # BLD: 1.5 K/UL (ref 0.8–3.5)
LYMPHOCYTES NFR BLD: 22 % (ref 12–49)
MCH RBC QN AUTO: 27.9 PG (ref 26–34)
MCHC RBC AUTO-ENTMCNC: 31 G/DL (ref 30–36.5)
MCV RBC AUTO: 89.9 FL (ref 80–99)
MONOCYTES # BLD: 0.5 K/UL (ref 0–1)
MONOCYTES NFR BLD: 7 % (ref 5–13)
NEUTS SEG # BLD: 4.8 K/UL (ref 1.8–8)
NEUTS SEG NFR BLD: 70 % (ref 32–75)
NRBC # BLD: 0 K/UL (ref 0–0.01)
NRBC BLD-RTO: 0 PER 100 WBC
PLATELET # BLD AUTO: 215 K/UL (ref 150–400)
PMV BLD AUTO: 12.1 FL (ref 8.9–12.9)
POTASSIUM SERPL-SCNC: 4 MMOL/L (ref 3.5–5.1)
PROT SERPL-MCNC: 6.9 G/DL (ref 6.4–8.2)
RBC # BLD AUTO: 4.37 M/UL (ref 3.8–5.2)
SODIUM SERPL-SCNC: 142 MMOL/L (ref 136–145)
TRIGL SERPL-MCNC: 81 MG/DL
TSH SERPL DL<=0.05 MIU/L-ACNC: 2.08 UIU/ML (ref 0.36–3.74)
VIT B12 SERPL-MCNC: 1588 PG/ML (ref 193–986)
VLDLC SERPL CALC-MCNC: 16.2 MG/DL
WBC # BLD AUTO: 6.9 K/UL (ref 3.6–11)

## 2024-10-31 ENCOUNTER — TELEPHONE (OUTPATIENT)
Facility: CLINIC | Age: 59
End: 2024-10-31

## 2024-10-31 NOTE — TELEPHONE ENCOUNTER
----- Message from KATHIE Gordon CNP sent at 10/31/2024  8:42 AM EDT -----  Regarding: BPs  Please call and ask pt for her BP log and forward the readings to me.  Thanks!  ----- Message -----  From: Stephany Zuñiga APRN - CNP  Sent: 10/31/2024  12:00 AM EDT  To: KATHIE Rojas CNP    Call to inquire about BPs after stopping HCTZ and continuing lisinopril at 20 mg (instead of 10)

## 2024-11-12 ENCOUNTER — TELEPHONE (OUTPATIENT)
Facility: CLINIC | Age: 59
End: 2024-11-12

## 2024-11-12 NOTE — TELEPHONE ENCOUNTER
I called pt and LVM about labs.   Per Stephany:     B12 is higher than necessary - can cut B12 to 500mcg daily.  All other labs look great!

## 2024-11-13 DIAGNOSIS — Z12.31 ENCOUNTER FOR SCREENING MAMMOGRAM FOR MALIGNANT NEOPLASM OF BREAST: ICD-10-CM

## 2024-12-09 RX ORDER — BUPROPION HYDROCHLORIDE 300 MG/1
300 TABLET ORAL EVERY MORNING
Qty: 90 TABLET | Refills: 0 | Status: SHIPPED | OUTPATIENT
Start: 2024-12-09

## 2024-12-09 NOTE — TELEPHONE ENCOUNTER
PCP: Stephany Zuñiga APRN - CNP     Last appt:  10/17/2024      Future Appointments   Date Time Provider Department Center   4/17/2025  9:00 AM Stephany Zuñiga APRN - CNP [facility] ECC DEP          Requested Prescriptions     Pending Prescriptions Disp Refills    buPROPion (WELLBUTRIN XL) 300 MG extended release tablet [Pharmacy Med Name: buPROPion HCl ER (XL) Oral Tablet Extended Release 24 Hour 300 MG] 90 tablet 3     Sig: TAKE 1 TABLET EVERY MORNING

## 2025-01-31 DIAGNOSIS — R73.03 PREDIABETES: ICD-10-CM

## 2025-01-31 DIAGNOSIS — E78.2 MIXED HYPERLIPIDEMIA: ICD-10-CM

## 2025-01-31 DIAGNOSIS — E66.01 SEVERE OBESITY (BMI 35.0-35.9 WITH COMORBIDITY): ICD-10-CM

## 2025-01-31 RX ORDER — SEMAGLUTIDE 2.68 MG/ML
2 INJECTION, SOLUTION SUBCUTANEOUS
Qty: 3 ML | Refills: 5 | Status: SHIPPED | OUTPATIENT
Start: 2025-01-31

## 2025-01-31 NOTE — TELEPHONE ENCOUNTER
Caller requests Refill of:  semaglutide, 2 MG/DOSE, (OZEMPIC, 2 MG/DOSE,) 8 MG/3ML SOPN sc injection (need 84 day supply)       Please send to:    [facility] [address] [place] [address]     Visit / Appointment History:  Future Appointment at [facility]:  4/17/2025   Last Appointment With PCP:  10/17/2024       Caller confirmed instructions and dosages as correct.    Caller was advised that Meds will be refilled as soon as possible, however there can be a 48-72 business hour turn around on refill requests.

## 2025-01-31 NOTE — TELEPHONE ENCOUNTER
Future Appointments:  Future Appointments   Date Time Provider Department Center   2/17/2025 10:15 AM Bam Mcneil MD [facility] BS [facility]   4/17/2025  9:00 AM Stephany Zuñiga, KATHIE - CNP [facility] BS ECC DEP        Last Appointment With Me:  10/17/2024     Requested Prescriptions     Pending Prescriptions Disp Refills    semaglutide, 2 MG/DOSE, (OZEMPIC, 2 MG/DOSE,) 8 MG/3ML SOPN sc injection 3 mL 5     Sig: Inject 2 mg into the skin every 7 days

## 2025-02-06 RX ORDER — FUROSEMIDE 20 MG/1
TABLET ORAL
Qty: 90 TABLET | Refills: 0 | Status: SHIPPED | OUTPATIENT
Start: 2025-02-06

## 2025-02-06 NOTE — TELEPHONE ENCOUNTER
PCP: Stephany Zuñiga APRN - CNP     Last appt:  10/17/2024      Future Appointments   Date Time Provider Department Center   2/17/2025 10:15 AM Bam Mcneil MD [facility]   4/17/2025  9:00 AM Stephany Zuñiga APRN - CNP [facility] ECC DEP          Requested Prescriptions     Pending Prescriptions Disp Refills    furosemide (LASIX) 20 MG tablet [Pharmacy Med Name: Furosemide Oral Tablet 20 MG] 90 tablet 3     Sig: TAKE 1 TABLET EVERY DAY

## 2025-02-20 RX ORDER — BUPROPION HYDROCHLORIDE 300 MG/1
300 TABLET ORAL EVERY MORNING
Qty: 90 TABLET | Refills: 3 | Status: SHIPPED | OUTPATIENT
Start: 2025-02-20

## 2025-02-20 NOTE — TELEPHONE ENCOUNTER
PCP: Stephany Zuñiga APRN - CNP     Last appt:  10/17/2024      Future Appointments   Date Time Provider Department Center   4/17/2025  9:00 AM Stephany Zuñiga APRN - CNP [facility] ECC DEP          Requested Prescriptions     Pending Prescriptions Disp Refills    buPROPion (WELLBUTRIN XL) 300 MG extended release tablet [Pharmacy Med Name: buPROPion HCl ER (XL) Oral Tablet Extended Release 24 Hour 300 MG] 90 tablet 3     Sig: TAKE 1 TABLET EVERY MORNING

## 2025-04-16 RX ORDER — FUROSEMIDE 20 MG/1
20 TABLET ORAL DAILY
Qty: 90 TABLET | Refills: 3 | Status: SHIPPED | OUTPATIENT
Start: 2025-04-16

## 2025-04-16 NOTE — TELEPHONE ENCOUNTER
PCP: Stephany Zuñiga APRN - CNP     Last appt:  10/17/2024      Future Appointments   Date Time Provider Department Center   5/27/2025  2:20 PM Stephany Zuñiga APRN - CNP [facility] ECC DEP          Requested Prescriptions     Pending Prescriptions Disp Refills    furosemide (LASIX) 20 MG tablet [Pharmacy Med Name: Furosemide Oral Tablet 20 MG] 90 tablet 3     Sig: TAKE 1 TABLET EVERY DAY

## 2025-05-05 RX ORDER — NALTREXONE HYDROCHLORIDE 50 MG/1
50 TABLET, FILM COATED ORAL DAILY
Qty: 90 TABLET | Refills: 3 | Status: SHIPPED | OUTPATIENT
Start: 2025-05-05

## 2025-05-05 RX ORDER — CITALOPRAM HYDROBROMIDE 20 MG/1
20 TABLET ORAL DAILY
Qty: 90 TABLET | Refills: 3 | Status: SHIPPED | OUTPATIENT
Start: 2025-05-05

## 2025-05-05 NOTE — TELEPHONE ENCOUNTER
PCP: Stephany Zuñiga APRN - CNP     Last appt:  10/17/2024      Future Appointments   Date Time Provider Department Center   5/29/2025  3:30 PM Stephany Zuñiga APRN - CNP [facility] DEP          Requested Prescriptions     Pending Prescriptions Disp Refills    naltrexone (DEPADE) 50 MG tablet [Pharmacy Med Name: Naltrexone HCl Oral Tablet 50 MG] 90 tablet 3     Sig: TAKE 1 TABLET EVERY DAY    citalopram (CELEXA) 20 MG tablet [Pharmacy Med Name: Citalopram Hydrobromide Oral Tablet 20 MG] 90 tablet 3     Sig: TAKE 1 TABLET EVERY DAY

## 2025-05-29 ENCOUNTER — OFFICE VISIT (OUTPATIENT)
Facility: CLINIC | Age: 60
End: 2025-05-29
Payer: MEDICARE

## 2025-05-29 VITALS
HEIGHT: 67 IN | BODY MASS INDEX: 28.6 KG/M2 | OXYGEN SATURATION: 97 % | RESPIRATION RATE: 13 BRPM | HEART RATE: 50 BPM | WEIGHT: 182.2 LBS | TEMPERATURE: 97.9 F | SYSTOLIC BLOOD PRESSURE: 141 MMHG | DIASTOLIC BLOOD PRESSURE: 60 MMHG

## 2025-05-29 DIAGNOSIS — I10 BENIGN ESSENTIAL HYPERTENSION: ICD-10-CM

## 2025-05-29 DIAGNOSIS — R73.03 PREDIABETES: ICD-10-CM

## 2025-05-29 DIAGNOSIS — I25.2 HISTORY OF MI (MYOCARDIAL INFARCTION): ICD-10-CM

## 2025-05-29 DIAGNOSIS — I25.10 CORONARY ARTERY DISEASE INVOLVING NATIVE CORONARY ARTERY OF NATIVE HEART WITHOUT ANGINA PECTORIS: ICD-10-CM

## 2025-05-29 DIAGNOSIS — G47.09 OTHER INSOMNIA: ICD-10-CM

## 2025-05-29 DIAGNOSIS — E53.8 B12 DEFICIENCY: ICD-10-CM

## 2025-05-29 DIAGNOSIS — E66.3 OVERWEIGHT (BMI 25.0-29.9): Primary | ICD-10-CM

## 2025-05-29 DIAGNOSIS — J43.2 CENTRILOBULAR EMPHYSEMA (HCC): ICD-10-CM

## 2025-05-29 DIAGNOSIS — Z29.9 PREVENTIVE MEASURE: ICD-10-CM

## 2025-05-29 DIAGNOSIS — E78.2 MIXED HYPERLIPIDEMIA: ICD-10-CM

## 2025-05-29 PROBLEM — R42 DIZZINESS: Status: RESOLVED | Noted: 2024-10-17 | Resolved: 2025-05-29

## 2025-05-29 PROCEDURE — 3023F SPIROM DOC REV: CPT | Performed by: NURSE PRACTITIONER

## 2025-05-29 PROCEDURE — 3077F SYST BP >= 140 MM HG: CPT | Performed by: NURSE PRACTITIONER

## 2025-05-29 PROCEDURE — 1036F TOBACCO NON-USER: CPT | Performed by: NURSE PRACTITIONER

## 2025-05-29 PROCEDURE — 3017F COLORECTAL CA SCREEN DOC REV: CPT | Performed by: NURSE PRACTITIONER

## 2025-05-29 PROCEDURE — 3078F DIAST BP <80 MM HG: CPT | Performed by: NURSE PRACTITIONER

## 2025-05-29 PROCEDURE — 99214 OFFICE O/P EST MOD 30 MIN: CPT | Performed by: NURSE PRACTITIONER

## 2025-05-29 PROCEDURE — G8427 DOCREV CUR MEDS BY ELIG CLIN: HCPCS | Performed by: NURSE PRACTITIONER

## 2025-05-29 PROCEDURE — G8417 CALC BMI ABV UP PARAM F/U: HCPCS | Performed by: NURSE PRACTITIONER

## 2025-05-29 RX ORDER — SEMAGLUTIDE 1.34 MG/ML
INJECTION, SOLUTION SUBCUTANEOUS
COMMUNITY
End: 2025-05-29 | Stop reason: ALTCHOICE

## 2025-05-29 ASSESSMENT — PATIENT HEALTH QUESTIONNAIRE - PHQ9
2. FEELING DOWN, DEPRESSED OR HOPELESS: NOT AT ALL
SUM OF ALL RESPONSES TO PHQ QUESTIONS 1-9: 0
SUM OF ALL RESPONSES TO PHQ QUESTIONS 1-9: 0
1. LITTLE INTEREST OR PLEASURE IN DOING THINGS: NOT AT ALL
SUM OF ALL RESPONSES TO PHQ QUESTIONS 1-9: 0
SUM OF ALL RESPONSES TO PHQ QUESTIONS 1-9: 0

## 2025-05-29 NOTE — PROGRESS NOTES
Maryann Gallagher is a 59 y.o. female    Chief Complaint   Patient presents with    6 Month Follow-Up    Hypertension    Discuss Medications     Ozempic      Vitals:    05/29/25 0959 05/29/25 1011   BP: (!) 149/70 (!) 141/60   BP Site: Left Upper Arm Left Upper Arm   Patient Position: Sitting Sitting   Pulse: 50    Resp: 13    Temp: 97.9 °F (36.6 °C)    SpO2: 97%    Weight: 82.6 kg (182 lb 3.2 oz)    Height: 1.702 m (5' 7\")        .      Health Maintenance Due   Topic Date Due    HIV screen  Never done    Respiratory Syncytial Virus (RSV) Pregnant or age 60 yrs+ (1 - Risk 60-74 years 1-dose series) Never done         \"Have you been to the ER, urgent care clinic since your last visit?  Hospitalized since your last visit?\"    NO    “Have you seen or consulted any other health care providers outside of [facility] since your last visit?”    NO            
   Patient-reported     Interpretation of Total Score Depression Severity: 1-4 = Minimal depression, 5-9 = Mild depression, 10-14 = Moderate depression, 15-19 = Moderately severe depression, 20-27 = Severe depression          No data to display                 Physical Exam   GENERAL: Patient appears well nourished, well developed.  In no acute distress.  EYES: Sclera anicteric, conjunctiva not injected.   EOMI grossly.  EARS: Hearing intact to spoken voice.  No gross outer ear deformity.    NECK: Appears normal.  Supple.  No lymphadenopathy.  No thyromegaly.  RESPIRATORY: Clear to auscultation bilaterally.  Normal inspiratory to expiratory ratio.  Normal rate and excursion.  No adventitious breath sounds.  CARDIAC: Regular rate and rhythm.    II/VI systolic murmur (known). No peripheral edema noted.  SKIN: Unremarkable.  No worrisome lesions were seen. No significant rash.  Color is normal. Warm.    MUSCULOSKELETAL: Appears grossly normal.  No abnormalities noted.  Upper and lower extremity strength 5/5. No gross joint or muscle swelling or deformity.    NEUROLOGICAL:   Alert and oriented.  Gait normal.  Movements grossly normal.    PSYCHIATRIC:  Affect full range. Thoughts linear, coherent, goal-directed. Insight good  No suicidal ideation.  HEMATOLOGIC / LYMPHATIC - No gross bruising    Past Medical History:   Diagnosis Date    Arthritis     CAD (coronary artery disease)     Deep vein thrombosis (DVT) ([facility])     left leg    History of infection of total joint prosthesis of knee 01/19/2024    Hx of ulcer disease     Hypertension     Infected prosthetic knee joint, subsequent encounter 10/23/2021    Infection of total left knee replacement, sequela 02/13/2024    MI (myocardial infarction) ([facility])     2 stents    Mixed hyperlipidemia     high cholesterol    Non-pressure chronic ulcer of left lower leg with fat layer exposed ([facility]) 07/11/2019    Prediabetes 09/17/2023    Hemoglobin A1C      Date    Value    Ref Range

## 2025-05-30 LAB
ALBUMIN SERPL-MCNC: 3.8 G/DL (ref 3.5–5)
ALBUMIN/GLOB SERPL: 1.1 (ref 1.1–2.2)
ALP SERPL-CCNC: 75 U/L (ref 45–117)
ALT SERPL-CCNC: 24 U/L (ref 12–78)
ANION GAP SERPL CALC-SCNC: 3 MMOL/L (ref 2–12)
AST SERPL-CCNC: 17 U/L (ref 15–37)
BILIRUB SERPL-MCNC: 0.8 MG/DL (ref 0.2–1)
BUN SERPL-MCNC: 15 MG/DL (ref 6–20)
BUN/CREAT SERPL: 16 (ref 12–20)
CALCIUM SERPL-MCNC: 9.8 MG/DL (ref 8.5–10.1)
CHLORIDE SERPL-SCNC: 106 MMOL/L (ref 97–108)
CHOLEST SERPL-MCNC: 116 MG/DL
CO2 SERPL-SCNC: 32 MMOL/L (ref 21–32)
CREAT SERPL-MCNC: 0.95 MG/DL (ref 0.55–1.02)
GLOBULIN SER CALC-MCNC: 3.4 G/DL (ref 2–4)
GLUCOSE SERPL-MCNC: 89 MG/DL (ref 65–100)
HDLC SERPL-MCNC: 51 MG/DL
HDLC SERPL: 2.3 (ref 0–5)
LDLC SERPL CALC-MCNC: 50.6 MG/DL (ref 0–100)
POTASSIUM SERPL-SCNC: 4.5 MMOL/L (ref 3.5–5.1)
PROT SERPL-MCNC: 7.2 G/DL (ref 6.4–8.2)
SODIUM SERPL-SCNC: 141 MMOL/L (ref 136–145)
TRIGL SERPL-MCNC: 72 MG/DL
VIT B12 SERPL-MCNC: 475 PG/ML (ref 193–986)
VLDLC SERPL CALC-MCNC: 14.4 MG/DL

## 2025-06-11 ENCOUNTER — RESULTS FOLLOW-UP (OUTPATIENT)
Facility: CLINIC | Age: 60
End: 2025-06-11

## 2025-06-28 DIAGNOSIS — G47.09 OTHER INSOMNIA: ICD-10-CM

## 2025-06-30 NOTE — TELEPHONE ENCOUNTER
Future Appointments:  Future Appointments   Date Time Provider Department Center   12/1/2025 10:00 AM Stephany Zuñiga, APRN - CNP [facility] ECC DEP        Last Appointment With Me:  5/29/2025     Requested Prescriptions     Pending Prescriptions Disp Refills    atorvastatin (LIPITOR) 80 MG tablet [Pharmacy Med Name: Atorvastatin Calcium Oral Tablet 80 MG] 90 tablet 3     Sig: TAKE 1 TABLET EVERY NIGHT    traZODone (DESYREL) 50 MG tablet [Pharmacy Med Name: traZODone HCl Oral Tablet 50 MG] 90 tablet 3     Sig: TAKE 1/2 TO 1 TABLET AT BEDTIME

## 2025-07-02 RX ORDER — ATORVASTATIN CALCIUM 80 MG/1
80 TABLET, FILM COATED ORAL NIGHTLY
Qty: 90 TABLET | Refills: 3 | Status: SHIPPED | OUTPATIENT
Start: 2025-07-02

## 2025-07-02 RX ORDER — TRAZODONE HYDROCHLORIDE 50 MG/1
TABLET ORAL
Qty: 90 TABLET | Refills: 3 | Status: SHIPPED | OUTPATIENT
Start: 2025-07-02

## 2025-08-12 ENCOUNTER — TELEPHONE (OUTPATIENT)
Facility: CLINIC | Age: 60
End: 2025-08-12

## 2025-08-13 DIAGNOSIS — E78.2 MIXED HYPERLIPIDEMIA: ICD-10-CM

## 2025-08-13 DIAGNOSIS — E66.01 SEVERE OBESITY (BMI 35.0-35.9 WITH COMORBIDITY) (HCC): ICD-10-CM

## 2025-08-13 DIAGNOSIS — R73.03 PREDIABETES: ICD-10-CM

## 2025-08-14 RX ORDER — SEMAGLUTIDE 2.68 MG/ML
2 INJECTION, SOLUTION SUBCUTANEOUS
Qty: 3 ML | Refills: 3 | Status: SHIPPED | OUTPATIENT
Start: 2025-08-14

## (undated) DEVICE — MEDI-VAC SUCTION HIGH CAPACITY: Brand: CARDINAL HEALTH

## (undated) DEVICE — I.V. DRAIN SPONGES: Brand: DERMACEA

## (undated) DEVICE — PLUS HANDPIECE WITH SUCTION TUBING AND TRAUMA TIP WITH SMALL SOFT CONE: Brand: SURGILAV

## (undated) DEVICE — NEEDLE HYPO 22GA L1.5IN BLK POLYPR HUB S STL REG BVL STR

## (undated) DEVICE — HANDLE LT SNAP ON ULT DURABLE LENS FOR TRUMPF ALC DISPOSABLE

## (undated) DEVICE — SUTURE STRATAFIX SYMMETRIC SZ 1 L18IN ABSRB VLT CT1 L36CM SXPP1A404

## (undated) DEVICE — ABDOMINAL PAD: Brand: DERMACEA

## (undated) DEVICE — CUSTOM CAST PD STR

## (undated) DEVICE — SUTURE VCRL SZ 1 L36IN ABSRB VLT L36MM CT-1 1/2 CIR J347H

## (undated) DEVICE — TRAY CATH 16F DRN BG LTX -- CONVERT TO ITEM 363158

## (undated) DEVICE — SOLUTION IV 1000ML 0.9% SOD CHL

## (undated) DEVICE — SOLUTION IRRIG 1000ML H2O STRL BLT

## (undated) DEVICE — DRAPE,REIN 53X77,STERILE: Brand: MEDLINE

## (undated) DEVICE — NEEDLE HYPO 18GA L1.5IN PNK S STL HUB POLYPR SHLD REG BVL

## (undated) DEVICE — REM POLYHESIVE ADULT PATIENT RETURN ELECTRODE: Brand: VALLEYLAB

## (undated) DEVICE — DEVON™ KNEE AND BODY STRAP 60" X 3" (1.5 M X 7.6 CM): Brand: DEVON

## (undated) DEVICE — SUTURE VCRL SZ 2-0 L36IN ABSRB VLT L36MM CT-1 1/2 CIR J345H

## (undated) DEVICE — KENDALL SCD EXPRESS SLEEVES, KNEE LENGTH, MEDIUM: Brand: KENDALL SCD

## (undated) DEVICE — STERILE POLYISOPRENE POWDER-FREE SURGICAL GLOVES: Brand: PROTEXIS

## (undated) DEVICE — (D)PREP SKN CHLRAPRP APPL 26ML -- CONVERT TO ITEM 371833

## (undated) DEVICE — SOLUTION IRRIG 3000ML 0.9% SOD CHL FLX CONT 0797208] ICU MEDICAL INC]

## (undated) DEVICE — STERILE POLYISOPRENE POWDER-FREE SURGICAL GLOVES WITH EMOLLIENT COATING: Brand: PROTEXIS

## (undated) DEVICE — MIXER BNE CEM VAC BRKOFF NOZ PRISM II

## (undated) DEVICE — STRYKER PERFORMANCE SERIES SAGITTAL BLADE: Brand: STRYKER PERFORMANCE SERIES

## (undated) DEVICE — ZIMMER® STERILE DISPOSABLE TOURNIQUET CUFF WITH PROTECTIVE SLEEVE AND PLC, DUAL PORT, SINGLE BLADDER, 34 IN. (86 CM)

## (undated) DEVICE — SUTURE STRATAFIX SPRL MCRYL + SZ 2-0 L18IN ABSRB UD CT-1 SXMP1B413

## (undated) DEVICE — HOOK LOCK LATEX FREE ELASTIC BANDAGE D/L 6INX10YD

## (undated) DEVICE — 3M™ IOBAN™ 2 ANTIMICROBIAL INCISE DRAPE 6651EZ: Brand: IOBAN™ 2

## (undated) DEVICE — SLIM BODY SKIN STAPLER: Brand: APPOSE ULC

## (undated) DEVICE — Device

## (undated) DEVICE — SYR LR LCK 1ML GRAD NSAF 30ML --

## (undated) DEVICE — INFECTION CONTROL KIT SYS

## (undated) DEVICE — 3M™ COBAN™ NL STERILE NON-LATEX SELF-ADHERENT WRAP, 2086S, 6 IN X 5 YD (15 CM X 4,5 M), 12 ROLLS/CASE: Brand: 3M™ COBAN™

## (undated) DEVICE — DRAIN KT WND 10FR RND 400ML --

## (undated) DEVICE — SPONGE LAP 18X18IN STRL -- 5/PK

## (undated) DEVICE — GOWN,SIRUS,NONRNF,SETINSLV,2XL,18/CS: Brand: MEDLINE

## (undated) DEVICE — GOWN,SIRUS,FABRNF,XL,20/CS: Brand: MEDLINE

## (undated) DEVICE — T4 HOOD